# Patient Record
Sex: MALE | Race: ASIAN | NOT HISPANIC OR LATINO | URBAN - METROPOLITAN AREA
[De-identification: names, ages, dates, MRNs, and addresses within clinical notes are randomized per-mention and may not be internally consistent; named-entity substitution may affect disease eponyms.]

---

## 2017-08-18 ENCOUNTER — INPATIENT (INPATIENT)
Facility: HOSPITAL | Age: 37
LOS: 1 days | Discharge: HOME | DRG: 340 | End: 2017-08-20
Attending: SURGERY | Admitting: SURGERY
Payer: COMMERCIAL

## 2017-08-18 VITALS
SYSTOLIC BLOOD PRESSURE: 147 MMHG | DIASTOLIC BLOOD PRESSURE: 104 MMHG | HEART RATE: 104 BPM | OXYGEN SATURATION: 96 % | RESPIRATION RATE: 18 BRPM | TEMPERATURE: 99 F

## 2017-08-18 PROCEDURE — 99285 EMERGENCY DEPT VISIT HI MDM: CPT | Mod: 25

## 2017-08-18 NOTE — ED ADULT NURSE NOTE - OBJECTIVE STATEMENT
rlq abd pain since am; no n/v or diarhea; pt guarding abdomen; facial grimacing; did not take any thing for pain at home; never had this pain before; last bm today

## 2017-08-19 ENCOUNTER — RESULT REVIEW (OUTPATIENT)
Age: 37
End: 2017-08-19

## 2017-08-19 ENCOUNTER — TRANSCRIPTION ENCOUNTER (OUTPATIENT)
Age: 37
End: 2017-08-19

## 2017-08-19 DIAGNOSIS — K37 UNSPECIFIED APPENDICITIS: ICD-10-CM

## 2017-08-19 LAB
ALBUMIN SERPL ELPH-MCNC: 4.7 G/DL — SIGNIFICANT CHANGE UP (ref 3.3–5)
ALP SERPL-CCNC: 63 U/L — SIGNIFICANT CHANGE UP (ref 40–120)
ALT FLD-CCNC: 32 U/L RC — SIGNIFICANT CHANGE UP (ref 10–45)
ANION GAP SERPL CALC-SCNC: 14 MMOL/L — SIGNIFICANT CHANGE UP (ref 5–17)
ANION GAP SERPL CALC-SCNC: 14 MMOL/L — SIGNIFICANT CHANGE UP (ref 5–17)
APPEARANCE UR: CLEAR — SIGNIFICANT CHANGE UP
APTT BLD: 36.6 SEC — SIGNIFICANT CHANGE UP (ref 27.5–37.4)
AST SERPL-CCNC: 34 U/L — SIGNIFICANT CHANGE UP (ref 10–40)
BILIRUB SERPL-MCNC: 0.3 MG/DL — SIGNIFICANT CHANGE UP (ref 0.2–1.2)
BILIRUB UR-MCNC: NEGATIVE — SIGNIFICANT CHANGE UP
BLD GP AB SCN SERPL QL: NEGATIVE — SIGNIFICANT CHANGE UP
BUN SERPL-MCNC: 10 MG/DL — SIGNIFICANT CHANGE UP (ref 7–23)
BUN SERPL-MCNC: 10 MG/DL — SIGNIFICANT CHANGE UP (ref 7–23)
CALCIUM SERPL-MCNC: 8.8 MG/DL — SIGNIFICANT CHANGE UP (ref 8.4–10.5)
CALCIUM SERPL-MCNC: 8.9 MG/DL — SIGNIFICANT CHANGE UP (ref 8.4–10.5)
CHLORIDE SERPL-SCNC: 101 MMOL/L — SIGNIFICANT CHANGE UP (ref 96–108)
CHLORIDE SERPL-SCNC: 102 MMOL/L — SIGNIFICANT CHANGE UP (ref 96–108)
CO2 SERPL-SCNC: 23 MMOL/L — SIGNIFICANT CHANGE UP (ref 22–31)
CO2 SERPL-SCNC: 25 MMOL/L — SIGNIFICANT CHANGE UP (ref 22–31)
COLOR SPEC: SIGNIFICANT CHANGE UP
CREAT SERPL-MCNC: 0.98 MG/DL — SIGNIFICANT CHANGE UP (ref 0.5–1.3)
CREAT SERPL-MCNC: 1.06 MG/DL — SIGNIFICANT CHANGE UP (ref 0.5–1.3)
DIFF PNL FLD: NEGATIVE — SIGNIFICANT CHANGE UP
GAS PNL BLDV: SIGNIFICANT CHANGE UP
GLUCOSE SERPL-MCNC: 138 MG/DL — HIGH (ref 70–99)
GLUCOSE SERPL-MCNC: 139 MG/DL — HIGH (ref 70–99)
GLUCOSE UR QL: NEGATIVE — SIGNIFICANT CHANGE UP
HCT VFR BLD CALC: 37.8 % — LOW (ref 39–50)
HCT VFR BLD CALC: 41.9 % — SIGNIFICANT CHANGE UP (ref 39–50)
HGB BLD-MCNC: 12.4 G/DL — LOW (ref 13–17)
HGB BLD-MCNC: 14.3 G/DL — SIGNIFICANT CHANGE UP (ref 13–17)
INR BLD: 1.11 RATIO — SIGNIFICANT CHANGE UP (ref 0.88–1.16)
KETONES UR-MCNC: NEGATIVE — SIGNIFICANT CHANGE UP
LEUKOCYTE ESTERASE UR-ACNC: NEGATIVE — SIGNIFICANT CHANGE UP
MAGNESIUM SERPL-MCNC: 2 MG/DL — SIGNIFICANT CHANGE UP (ref 1.6–2.6)
MCHC RBC-ENTMCNC: 25 PG — LOW (ref 27–34)
MCHC RBC-ENTMCNC: 27.3 PG — SIGNIFICANT CHANGE UP (ref 27–34)
MCHC RBC-ENTMCNC: 32.8 GM/DL — SIGNIFICANT CHANGE UP (ref 32–36)
MCHC RBC-ENTMCNC: 34.2 GM/DL — SIGNIFICANT CHANGE UP (ref 32–36)
MCV RBC AUTO: 76.2 FL — LOW (ref 80–100)
MCV RBC AUTO: 79.9 FL — LOW (ref 80–100)
NITRITE UR-MCNC: NEGATIVE — SIGNIFICANT CHANGE UP
PH UR: 8 — SIGNIFICANT CHANGE UP (ref 5–8)
PHOSPHATE SERPL-MCNC: 3.5 MG/DL — SIGNIFICANT CHANGE UP (ref 2.5–4.5)
PLATELET # BLD AUTO: 261 K/UL — SIGNIFICANT CHANGE UP (ref 150–400)
PLATELET # BLD AUTO: 274 K/UL — SIGNIFICANT CHANGE UP (ref 150–400)
POTASSIUM SERPL-MCNC: 3.5 MMOL/L — SIGNIFICANT CHANGE UP (ref 3.5–5.3)
POTASSIUM SERPL-MCNC: 4.7 MMOL/L — SIGNIFICANT CHANGE UP (ref 3.5–5.3)
POTASSIUM SERPL-SCNC: 3.5 MMOL/L — SIGNIFICANT CHANGE UP (ref 3.5–5.3)
POTASSIUM SERPL-SCNC: 4.7 MMOL/L — SIGNIFICANT CHANGE UP (ref 3.5–5.3)
PROT SERPL-MCNC: 7.3 G/DL — SIGNIFICANT CHANGE UP (ref 6–8.3)
PROT UR-MCNC: NEGATIVE — SIGNIFICANT CHANGE UP
PROTHROM AB SERPL-ACNC: 12.1 SEC — SIGNIFICANT CHANGE UP (ref 9.8–12.7)
RBC # BLD: 4.96 M/UL — SIGNIFICANT CHANGE UP (ref 4.2–5.8)
RBC # BLD: 5.24 M/UL — SIGNIFICANT CHANGE UP (ref 4.2–5.8)
RBC # FLD: 12.5 % — SIGNIFICANT CHANGE UP (ref 10.3–14.5)
RBC # FLD: 13.6 % — SIGNIFICANT CHANGE UP (ref 10.3–14.5)
RH IG SCN BLD-IMP: POSITIVE — SIGNIFICANT CHANGE UP
RH IG SCN BLD-IMP: POSITIVE — SIGNIFICANT CHANGE UP
SODIUM SERPL-SCNC: 138 MMOL/L — SIGNIFICANT CHANGE UP (ref 135–145)
SODIUM SERPL-SCNC: 141 MMOL/L — SIGNIFICANT CHANGE UP (ref 135–145)
SP GR SPEC: 1.01 — SIGNIFICANT CHANGE UP (ref 1.01–1.02)
UROBILINOGEN FLD QL: NEGATIVE — SIGNIFICANT CHANGE UP
WBC # BLD: 10.8 K/UL — HIGH (ref 3.8–10.5)
WBC # BLD: 8.63 K/UL — SIGNIFICANT CHANGE UP (ref 3.8–10.5)
WBC # FLD AUTO: 10.8 K/UL — HIGH (ref 3.8–10.5)
WBC # FLD AUTO: 8.63 K/UL — SIGNIFICANT CHANGE UP (ref 3.8–10.5)

## 2017-08-19 PROCEDURE — 99222 1ST HOSP IP/OBS MODERATE 55: CPT | Mod: 57

## 2017-08-19 PROCEDURE — 74177 CT ABD & PELVIS W/CONTRAST: CPT | Mod: 26

## 2017-08-19 PROCEDURE — 76705 ECHO EXAM OF ABDOMEN: CPT | Mod: 26,RT

## 2017-08-19 PROCEDURE — 88304 TISSUE EXAM BY PATHOLOGIST: CPT | Mod: 26

## 2017-08-19 PROCEDURE — 44970 LAPAROSCOPY APPENDECTOMY: CPT

## 2017-08-19 RX ORDER — HYDROMORPHONE HYDROCHLORIDE 2 MG/ML
0.5 INJECTION INTRAMUSCULAR; INTRAVENOUS; SUBCUTANEOUS
Qty: 0 | Refills: 0 | Status: DISCONTINUED | OUTPATIENT
Start: 2017-08-19 | End: 2017-08-19

## 2017-08-19 RX ORDER — MORPHINE SULFATE 50 MG/1
4 CAPSULE, EXTENDED RELEASE ORAL ONCE
Qty: 0 | Refills: 0 | Status: DISCONTINUED | OUTPATIENT
Start: 2017-08-19 | End: 2017-08-19

## 2017-08-19 RX ORDER — PIPERACILLIN AND TAZOBACTAM 4; .5 G/20ML; G/20ML
3.38 INJECTION, POWDER, LYOPHILIZED, FOR SOLUTION INTRAVENOUS EVERY 8 HOURS
Qty: 0 | Refills: 0 | Status: DISCONTINUED | OUTPATIENT
Start: 2017-08-19 | End: 2017-08-19

## 2017-08-19 RX ORDER — OXYCODONE AND ACETAMINOPHEN 5; 325 MG/1; MG/1
1 TABLET ORAL EVERY 6 HOURS
Qty: 0 | Refills: 0 | Status: DISCONTINUED | OUTPATIENT
Start: 2017-08-19 | End: 2017-08-20

## 2017-08-19 RX ORDER — PIPERACILLIN AND TAZOBACTAM 4; .5 G/20ML; G/20ML
3.38 INJECTION, POWDER, LYOPHILIZED, FOR SOLUTION INTRAVENOUS ONCE
Qty: 0 | Refills: 0 | Status: COMPLETED | OUTPATIENT
Start: 2017-08-19 | End: 2017-08-19

## 2017-08-19 RX ORDER — SODIUM CHLORIDE 9 MG/ML
1000 INJECTION, SOLUTION INTRAVENOUS
Qty: 0 | Refills: 0 | Status: DISCONTINUED | OUTPATIENT
Start: 2017-08-19 | End: 2017-08-20

## 2017-08-19 RX ORDER — ONDANSETRON 8 MG/1
4 TABLET, FILM COATED ORAL ONCE
Qty: 0 | Refills: 0 | Status: DISCONTINUED | OUTPATIENT
Start: 2017-08-19 | End: 2017-08-19

## 2017-08-19 RX ORDER — OXYCODONE AND ACETAMINOPHEN 5; 325 MG/1; MG/1
2 TABLET ORAL EVERY 6 HOURS
Qty: 0 | Refills: 0 | Status: DISCONTINUED | OUTPATIENT
Start: 2017-08-19 | End: 2017-08-20

## 2017-08-19 RX ORDER — SODIUM CHLORIDE 9 MG/ML
1000 INJECTION INTRAMUSCULAR; INTRAVENOUS; SUBCUTANEOUS ONCE
Qty: 0 | Refills: 0 | Status: COMPLETED | OUTPATIENT
Start: 2017-08-19 | End: 2017-08-19

## 2017-08-19 RX ORDER — HYDROMORPHONE HYDROCHLORIDE 2 MG/ML
1 INJECTION INTRAMUSCULAR; INTRAVENOUS; SUBCUTANEOUS
Qty: 0 | Refills: 0 | Status: DISCONTINUED | OUTPATIENT
Start: 2017-08-19 | End: 2017-08-19

## 2017-08-19 RX ORDER — HEPARIN SODIUM 5000 [USP'U]/ML
5000 INJECTION INTRAVENOUS; SUBCUTANEOUS EVERY 8 HOURS
Qty: 0 | Refills: 0 | Status: DISCONTINUED | OUTPATIENT
Start: 2017-08-19 | End: 2017-08-19

## 2017-08-19 RX ORDER — MORPHINE SULFATE 50 MG/1
2 CAPSULE, EXTENDED RELEASE ORAL ONCE
Qty: 0 | Refills: 0 | Status: DISCONTINUED | OUTPATIENT
Start: 2017-08-19 | End: 2017-08-19

## 2017-08-19 RX ORDER — BENZOCAINE AND MENTHOL 5; 1 G/100ML; G/100ML
1 LIQUID ORAL
Qty: 0 | Refills: 0 | Status: DISCONTINUED | OUTPATIENT
Start: 2017-08-19 | End: 2017-08-20

## 2017-08-19 RX ORDER — POTASSIUM CHLORIDE 20 MEQ
20 PACKET (EA) ORAL
Qty: 0 | Refills: 0 | Status: DISCONTINUED | OUTPATIENT
Start: 2017-08-19 | End: 2017-08-19

## 2017-08-19 RX ORDER — OMEPRAZOLE 10 MG/1
0 CAPSULE, DELAYED RELEASE ORAL
Qty: 0 | Refills: 0 | COMMUNITY

## 2017-08-19 RX ORDER — POTASSIUM CHLORIDE 20 MEQ
10 PACKET (EA) ORAL
Qty: 0 | Refills: 0 | Status: DISCONTINUED | OUTPATIENT
Start: 2017-08-19 | End: 2017-08-20

## 2017-08-19 RX ORDER — SODIUM CHLORIDE 9 MG/ML
1000 INJECTION, SOLUTION INTRAVENOUS
Qty: 0 | Refills: 0 | Status: DISCONTINUED | OUTPATIENT
Start: 2017-08-19 | End: 2017-08-19

## 2017-08-19 RX ORDER — ENOXAPARIN SODIUM 100 MG/ML
40 INJECTION SUBCUTANEOUS EVERY 24 HOURS
Qty: 0 | Refills: 0 | Status: DISCONTINUED | OUTPATIENT
Start: 2017-08-19 | End: 2017-08-20

## 2017-08-19 RX ADMIN — Medication 100 MILLIEQUIVALENT(S): at 21:10

## 2017-08-19 RX ADMIN — MORPHINE SULFATE 2 MILLIGRAM(S): 50 CAPSULE, EXTENDED RELEASE ORAL at 22:22

## 2017-08-19 RX ADMIN — PIPERACILLIN AND TAZOBACTAM 25 GRAM(S): 4; .5 INJECTION, POWDER, LYOPHILIZED, FOR SOLUTION INTRAVENOUS at 13:02

## 2017-08-19 RX ADMIN — MORPHINE SULFATE 2 MILLIGRAM(S): 50 CAPSULE, EXTENDED RELEASE ORAL at 21:52

## 2017-08-19 RX ADMIN — HEPARIN SODIUM 5000 UNIT(S): 5000 INJECTION INTRAVENOUS; SUBCUTANEOUS at 06:03

## 2017-08-19 RX ADMIN — SODIUM CHLORIDE 100 MILLILITER(S): 9 INJECTION, SOLUTION INTRAVENOUS at 05:51

## 2017-08-19 RX ADMIN — SODIUM CHLORIDE 1000 MILLILITER(S): 9 INJECTION INTRAMUSCULAR; INTRAVENOUS; SUBCUTANEOUS at 01:54

## 2017-08-19 RX ADMIN — PIPERACILLIN AND TAZOBACTAM 200 GRAM(S): 4; .5 INJECTION, POWDER, LYOPHILIZED, FOR SOLUTION INTRAVENOUS at 05:08

## 2017-08-19 RX ADMIN — HEPARIN SODIUM 5000 UNIT(S): 5000 INJECTION INTRAVENOUS; SUBCUTANEOUS at 13:08

## 2017-08-19 RX ADMIN — Medication 100 MILLIEQUIVALENT(S): at 15:21

## 2017-08-19 RX ADMIN — MORPHINE SULFATE 4 MILLIGRAM(S): 50 CAPSULE, EXTENDED RELEASE ORAL at 01:55

## 2017-08-19 RX ADMIN — MORPHINE SULFATE 4 MILLIGRAM(S): 50 CAPSULE, EXTENDED RELEASE ORAL at 04:54

## 2017-08-19 NOTE — H&P ADULT - NSHPPHYSICALEXAM_GEN_ALL_CORE
General: No acute distress, resting comfortably  Respiratory: Nonlabored, clear to auscultation bilaterally  Cardiovascular: Regular rate and rhythm  Abdomen: Soft, nondistended, tender in RLQ, no rebound or guarding  Extremities: Warm

## 2017-08-19 NOTE — H&P ADULT - NSHPLABSRESULTS_GEN_ALL_CORE
CBC (08-19 @ 00:28)                              14.3                           10.8<H>  )----------------(  261        --    % Neutrophils, --    % Lymphocytes, ANC: --                                  41.9      BMP (08-19 @ 00:28)             141     |  102     |  10    		Ca++ --      Ca 8.9                ---------------------------------( 138<H>		Mg --                 4.7     |  25      |  1.06  			Ph --        LFTs (08-19 @ 00:28)      TPro 7.3 / Alb 4.7 / TBili 0.3 / DBili -- / AST 34 / ALT 32 / AlkPhos 63    Coags (08-19 @ 02:02)  aPTT 36.6 / INR 1.11 / PT 12.1        VBG (08-19 @ 02:02)     7.42 / 45 / 51<H> / 28 / 3.9<H> / 85%     Lactate: 1.2      IMAGING:  CT Abdomen and Pelvis w/ Oral Cont and w/ IV Cont (08.19.17 @ 04:11) >  IMPRESSION:  Acute appendicitis without evidence for perforation or abscess formation.  Mild hepatic steatosis.

## 2017-08-19 NOTE — H&P ADULT - NSHPREVIEWOFSYSTEMS_GEN_ALL_CORE
General: No fevers/chills, normal appetite  HEENT: No blurry or double vision, no runny nose  Respiratory: No cough, no shortness of breath  Cardiovascular: No palpitations, no chest pain  Gastrointestinal: + abdominal pain; No nausea, emesis, constipation, diarrhea, melena  Genitourinary: No dysuria, no urinary frequency  Integumentary: No rashes  Psych: Normal interactions  Neuro: No confusion  Musculoskeletal: no joint pain

## 2017-08-19 NOTE — H&P ADULT - ATTENDING COMMENTS
36 year old male with past medical history significant for GERD presents with one day of pain in the right lower quadrant. He denies chest pain or dyspnea.  I have reviewed his PMH/PSH/medications/labs and imaging.  On physical exam he is hemodynamically normal.  Cardiopulmonary exam is normal  He has tenderness to the right lower quadrant to palpation with localized peritonitis.  Laboratory data reviewed with WBC 10.8.    The patient has clinical and radiographic signs indicative of acute appendicitis. Patient is admitted and consented for a laparoscopic appendectomy. The risks and benefits have been explained.  NPO, IVF  Continue antibiotics  Type and screen

## 2017-08-19 NOTE — ED PROVIDER NOTE - OBJECTIVE STATEMENT
Malia Seals, DO: 36M with hx of GERD here for constant R flank pain  and RLQ pain x 24 hours that migrated from R flank to RLQ. No hx of abdominal surgeries. Last BM today. Denies nausea/vomiting, diarrhea, testicular pain, dark or bloody stools, hematuria, dysuria, frequency.   PMD: Kyung

## 2017-08-19 NOTE — H&P ADULT - HISTORY OF PRESENT ILLNESS
36M with PMHx GERD presenting with abdominal pain. Patient reports pain began yesterday morning in his right back and radiated to his right anterior abdomen and down to his groin. He denies fevers but reports chills. No nausea/emesis, diarrhea, or dysuria. has never had symptoms like this before.

## 2017-08-19 NOTE — PROGRESS NOTE ADULT - SUBJECTIVE AND OBJECTIVE BOX
[facility] GENERAL SURGERY POST-OP NOTE    SUBJECTIVE: Pt seen and evaluated at bedside. Resting comfortably in bed. Pain controlled. Denies nausea/vomiting, CP, palpitations, SOB, lightheaded, dizziness.    Objective:  Gen: NAD, AAOx3  Pulm: b/l chest rise. No work on breathing  Card: normal rate, RR, no m/r/g  Abd: soft, appropriately tender, ND. Dressings CDI.    Vital Signs Last 24 Hrs  T(C): 37.2 (19 Aug 2017 22:13), Max: 37.3 (19 Aug 2017 15:38)  T(F): 98.9 (19 Aug 2017 22:13), Max: 99.1 (19 Aug 2017 15:38)  HR: 94 (19 Aug 2017 22:13) (79 - 108)  BP: 119/79 (19 Aug 2017 22:13) (114/65 - 150/106)  BP(mean): 94 (19 Aug 2017 19:15) (88 - 105)  RR: 18 (19 Aug 2017 22:13) (15 - 19)  SpO2: 94% (19 Aug 2017 22:13) (94% - 99%)  I&O's Summary    18 Aug 2017 07:  -  19 Aug 2017 07:00  --------------------------------------------------------  IN: 1200 mL / OUT: 0 mL / NET: 1200 mL    19 Aug 2017 07:  -  19 Aug 2017 23:03  --------------------------------------------------------  IN: 900 mL / OUT: 2475 mL / NET: -1575 mL      I&O's Detail    18 Aug 2017 07:  -  19 Aug 2017 07:00  --------------------------------------------------------  IN:    IV PiggyBack: 100 mL    lactated ringers.: 100 mL    Sodium Chloride 0.9% IV Bolus: 1000 mL  Total IN: 1200 mL    OUT:  Total OUT: 0 mL    Total NET: 1200 mL      19 Aug 2017 07:01  -  19 Aug 2017 23:03  --------------------------------------------------------  IN:    IV PiggyBack: 100 mL    lactated ringers.: 800 mL  Total IN: 900 mL    OUT:    Voided: 2475 mL  Total OUT: 2475 mL    Total NET: -1575 mL          MEDICATIONS  (STANDING):  potassium chloride  10 mEq/100 mL IVPB 10 milliEquivalent(s) IV Intermittent every 1 hour  enoxaparin Injectable 40 milliGRAM(s) SubCutaneous every 24 hours  lactated ringers. 1000 milliLiter(s) (100 mL/Hr) IV Continuous <Continuous>    MEDICATIONS  (PRN):  oxyCODONE    5 mG/acetaminophen 325 mG 1 Tablet(s) Oral every 6 hours PRN Moderate Pain (4 - 6)  oxyCODONE    5 mG/acetaminophen 325 mG 2 Tablet(s) Oral every 6 hours PRN Severe Pain (7 - 10)  benzocaine 15 mG/menthol 3.6 mG Lozenge 1 Lozenge Oral every 3 hours PRN Sore Throat      LABS:                        12.4   8.63  )-----------( 274      ( 19 Aug 2017 07:17 )             37.8         138  |  101  |  10  ----------------------------<  139<H>  3.5   |  23  |  0.98    Ca    8.8      19 Aug 2017 07:17  Phos  3.5       Mg     2.0         TPro  7.3  /  Alb  4.7  /  TBili  0.3  /  DBili  x   /  AST  34  /  ALT  32  /  AlkPhos  63      PT/INR - ( 19 Aug 2017 02:02 )   PT: 12.1 sec;   INR: 1.11 ratio         PTT - ( 19 Aug 2017 02:02 )  PTT:36.6 sec  Urinalysis Basic - ( 19 Aug 2017 00:48 )    Color: x / Appearance: Clear / S.011 / pH: x  Gluc: x / Ketone: Negative  / Bili: Negative / Urobili: Negative   Blood: x / Protein: Negative / Nitrite: Negative   Leuk Esterase: Negative / RBC: x / WBC x   Sq Epi: x / Non Sq Epi: x / Bacteria: x        RADIOLOGY & ADDITIONAL STUDIES:      A/P: 36y Male  s/p   - Pain control  - Strict I/O's  - F/U GI fxn  - advance diet as tolerates  - OOB/ambulate/DVT ppx  - AM labs [facility] GENERAL SURGERY POST-OP NOTE    SUBJECTIVE: Pt seen and evaluated at bedside. Resting comfortably in bed. Pain controlled. Denies nausea/vomiting, CP, palpitations, SOB, lightheaded, dizziness.    Objective:  Gen: NAD, AAOx3  Pulm: b/l chest rise. No work on breathing  Card: normal rate, RR, no m/r/g  Abd: soft, appropriately tender, ND. Dressings CDI.    Vital Signs Last 24 Hrs  T(C): 37.2 (19 Aug 2017 22:13), Max: 37.3 (19 Aug 2017 15:38)  T(F): 98.9 (19 Aug 2017 22:13), Max: 99.1 (19 Aug 2017 15:38)  HR: 94 (19 Aug 2017 22:13) (79 - 108)  BP: 119/79 (19 Aug 2017 22:13) (114/65 - 150/106)  BP(mean): 94 (19 Aug 2017 19:15) (88 - 105)  RR: 18 (19 Aug 2017 22:13) (15 - 19)  SpO2: 94% (19 Aug 2017 22:13) (94% - 99%)  I&O's Summary    18 Aug 2017 07:  -  19 Aug 2017 07:00  --------------------------------------------------------  IN: 1200 mL / OUT: 0 mL / NET: 1200 mL    19 Aug 2017 07:  -  19 Aug 2017 23:03  --------------------------------------------------------  IN: 900 mL / OUT: 2475 mL / NET: -1575 mL      I&O's Detail    18 Aug 2017 07:  -  19 Aug 2017 07:00  --------------------------------------------------------  IN:    IV PiggyBack: 100 mL    lactated ringers.: 100 mL    Sodium Chloride 0.9% IV Bolus: 1000 mL  Total IN: 1200 mL    OUT:  Total OUT: 0 mL    Total NET: 1200 mL      19 Aug 2017 07:01  -  19 Aug 2017 23:03  --------------------------------------------------------  IN:    IV PiggyBack: 100 mL    lactated ringers.: 800 mL  Total IN: 900 mL    OUT:    Voided: 2475 mL  Total OUT: 2475 mL    Total NET: -1575 mL          MEDICATIONS  (STANDING):  potassium chloride  10 mEq/100 mL IVPB 10 milliEquivalent(s) IV Intermittent every 1 hour  enoxaparin Injectable 40 milliGRAM(s) SubCutaneous every 24 hours  lactated ringers. 1000 milliLiter(s) (100 mL/Hr) IV Continuous <Continuous>    MEDICATIONS  (PRN):  oxyCODONE    5 mG/acetaminophen 325 mG 1 Tablet(s) Oral every 6 hours PRN Moderate Pain (4 - 6)  oxyCODONE    5 mG/acetaminophen 325 mG 2 Tablet(s) Oral every 6 hours PRN Severe Pain (7 - 10)  benzocaine 15 mG/menthol 3.6 mG Lozenge 1 Lozenge Oral every 3 hours PRN Sore Throat      LABS:                        12.4   8.63  )-----------( 274      ( 19 Aug 2017 07:17 )             37.8         138  |  101  |  10  ----------------------------<  139<H>  3.5   |  23  |  0.98    Ca    8.8      19 Aug 2017 07:17  Phos  3.5       Mg     2.0         TPro  7.3  /  Alb  4.7  /  TBili  0.3  /  DBili  x   /  AST  34  /  ALT  32  /  AlkPhos  63      PT/INR - ( 19 Aug 2017 02:02 )   PT: 12.1 sec;   INR: 1.11 ratio         PTT - ( 19 Aug 2017 02:02 )  PTT:36.6 sec  Urinalysis Basic - ( 19 Aug 2017 00:48 )    Color: x / Appearance: Clear / S.011 / pH: x  Gluc: x / Ketone: Negative  / Bili: Negative / Urobili: Negative   Blood: x / Protein: Negative / Nitrite: Negative   Leuk Esterase: Negative / RBC: x / WBC x   Sq Epi: x / Non Sq Epi: x / Bacteria: x        RADIOLOGY & ADDITIONAL STUDIES:      A/P: 36y Male  s/p lap appy without complications  - Pain control  - Strict I/O's  - F/U GI fxn  - advance diet as tolerates  - OOB/ambulate/DVT ppx  - AM labs

## 2017-08-19 NOTE — ED PROVIDER NOTE - MEDICAL DECISION MAKING DETAILS
BELGICA Cordova MD: 35 y/o male with PMH GERD p/w R sided flank and RLQ pain. Pain began in R flank 24 hrs ago, then migrated to RLQ. Last BM today. Denies nausea/vomiting, diarrhea, testicular pain, dark or bloody stools, hematuria, dysuria, frequency, f/c.  +RLQ ttp on exam  DDx: appy, kidney stone, pyelo  Plan: labs, u/a, ucx, CT A/P

## 2017-08-19 NOTE — ED PROVIDER NOTE - NS ED ROS FT
Malia Seals, DO: ROS: denies HA, weakness, dizziness, fevers/chills, nausea/vomiting, chest pain, SOB, diaphoresis, diarrhea, joint pain, neuro deficits, dysuria/hematuria, rash

## 2017-08-19 NOTE — H&P ADULT - ASSESSMENT
36M with acute appendicitis  -Admit to [facility] under Dr. Gonzalez  -NPO/IVF  -Antibiotics  -Preop/consent for OR today  -VTE prophylaxis

## 2017-08-20 ENCOUNTER — TRANSCRIPTION ENCOUNTER (OUTPATIENT)
Age: 37
End: 2017-08-20

## 2017-08-20 VITALS
SYSTOLIC BLOOD PRESSURE: 146 MMHG | TEMPERATURE: 98 F | DIASTOLIC BLOOD PRESSURE: 86 MMHG | OXYGEN SATURATION: 95 % | HEART RATE: 102 BPM | RESPIRATION RATE: 18 BRPM

## 2017-08-20 LAB
ANION GAP SERPL CALC-SCNC: 18 MMOL/L — HIGH (ref 5–17)
BASOPHILS # BLD AUTO: 0.01 K/UL — SIGNIFICANT CHANGE UP (ref 0–0.2)
BASOPHILS NFR BLD AUTO: 0.1 % — SIGNIFICANT CHANGE UP (ref 0–2)
BUN SERPL-MCNC: 10 MG/DL — SIGNIFICANT CHANGE UP (ref 7–23)
CALCIUM SERPL-MCNC: 9.3 MG/DL — SIGNIFICANT CHANGE UP (ref 8.4–10.5)
CHLORIDE SERPL-SCNC: 100 MMOL/L — SIGNIFICANT CHANGE UP (ref 96–108)
CO2 SERPL-SCNC: 22 MMOL/L — SIGNIFICANT CHANGE UP (ref 22–31)
CREAT SERPL-MCNC: 1.03 MG/DL — SIGNIFICANT CHANGE UP (ref 0.5–1.3)
CULTURE RESULTS: NO GROWTH — SIGNIFICANT CHANGE UP
EOSINOPHIL # BLD AUTO: 0.01 K/UL — SIGNIFICANT CHANGE UP (ref 0–0.5)
EOSINOPHIL NFR BLD AUTO: 0.1 % — SIGNIFICANT CHANGE UP (ref 0–6)
GLUCOSE SERPL-MCNC: 128 MG/DL — HIGH (ref 70–99)
HCT VFR BLD CALC: 39 % — SIGNIFICANT CHANGE UP (ref 39–50)
HGB BLD-MCNC: 12.9 G/DL — LOW (ref 13–17)
IMM GRANULOCYTES NFR BLD AUTO: 0.1 % — SIGNIFICANT CHANGE UP (ref 0–1.5)
LYMPHOCYTES # BLD AUTO: 1.62 K/UL — SIGNIFICANT CHANGE UP (ref 1–3.3)
LYMPHOCYTES # BLD AUTO: 17.2 % — SIGNIFICANT CHANGE UP (ref 13–44)
MCHC RBC-ENTMCNC: 24.8 PG — LOW (ref 27–34)
MCHC RBC-ENTMCNC: 33.1 GM/DL — SIGNIFICANT CHANGE UP (ref 32–36)
MCV RBC AUTO: 75 FL — LOW (ref 80–100)
MONOCYTES # BLD AUTO: 0.94 K/UL — HIGH (ref 0–0.9)
MONOCYTES NFR BLD AUTO: 10 % — SIGNIFICANT CHANGE UP (ref 2–14)
NEUTROPHILS # BLD AUTO: 6.84 K/UL — SIGNIFICANT CHANGE UP (ref 1.8–7.4)
NEUTROPHILS NFR BLD AUTO: 72.5 % — SIGNIFICANT CHANGE UP (ref 43–77)
PLATELET # BLD AUTO: 321 K/UL — SIGNIFICANT CHANGE UP (ref 150–400)
POTASSIUM SERPL-MCNC: 4 MMOL/L — SIGNIFICANT CHANGE UP (ref 3.5–5.3)
POTASSIUM SERPL-SCNC: 4 MMOL/L — SIGNIFICANT CHANGE UP (ref 3.5–5.3)
RBC # BLD: 5.2 M/UL — SIGNIFICANT CHANGE UP (ref 4.2–5.8)
RBC # FLD: 13.6 % — SIGNIFICANT CHANGE UP (ref 10.3–14.5)
SODIUM SERPL-SCNC: 140 MMOL/L — SIGNIFICANT CHANGE UP (ref 135–145)
SPECIMEN SOURCE: SIGNIFICANT CHANGE UP
WBC # BLD: 9.43 K/UL — SIGNIFICANT CHANGE UP (ref 3.8–10.5)
WBC # FLD AUTO: 9.43 K/UL — SIGNIFICANT CHANGE UP (ref 3.8–10.5)

## 2017-08-20 PROCEDURE — 82330 ASSAY OF CALCIUM: CPT

## 2017-08-20 PROCEDURE — 76705 ECHO EXAM OF ABDOMEN: CPT

## 2017-08-20 PROCEDURE — 96375 TX/PRO/DX INJ NEW DRUG ADDON: CPT

## 2017-08-20 PROCEDURE — 96374 THER/PROPH/DIAG INJ IV PUSH: CPT | Mod: XU

## 2017-08-20 PROCEDURE — 86901 BLOOD TYPING SEROLOGIC RH(D): CPT

## 2017-08-20 PROCEDURE — 86900 BLOOD TYPING SEROLOGIC ABO: CPT

## 2017-08-20 PROCEDURE — 85610 PROTHROMBIN TIME: CPT

## 2017-08-20 PROCEDURE — 85014 HEMATOCRIT: CPT

## 2017-08-20 PROCEDURE — 84100 ASSAY OF PHOSPHORUS: CPT

## 2017-08-20 PROCEDURE — 85027 COMPLETE CBC AUTOMATED: CPT

## 2017-08-20 PROCEDURE — 83690 ASSAY OF LIPASE: CPT

## 2017-08-20 PROCEDURE — 86850 RBC ANTIBODY SCREEN: CPT

## 2017-08-20 PROCEDURE — 74177 CT ABD & PELVIS W/CONTRAST: CPT

## 2017-08-20 PROCEDURE — 80053 COMPREHEN METABOLIC PANEL: CPT

## 2017-08-20 PROCEDURE — 85730 THROMBOPLASTIN TIME PARTIAL: CPT

## 2017-08-20 PROCEDURE — 99285 EMERGENCY DEPT VISIT HI MDM: CPT | Mod: 25

## 2017-08-20 PROCEDURE — 82947 ASSAY GLUCOSE BLOOD QUANT: CPT

## 2017-08-20 PROCEDURE — 83735 ASSAY OF MAGNESIUM: CPT

## 2017-08-20 PROCEDURE — 82435 ASSAY OF BLOOD CHLORIDE: CPT

## 2017-08-20 PROCEDURE — 87086 URINE CULTURE/COLONY COUNT: CPT

## 2017-08-20 PROCEDURE — 88304 TISSUE EXAM BY PATHOLOGIST: CPT

## 2017-08-20 PROCEDURE — 84132 ASSAY OF SERUM POTASSIUM: CPT

## 2017-08-20 PROCEDURE — 84295 ASSAY OF SERUM SODIUM: CPT

## 2017-08-20 PROCEDURE — 83605 ASSAY OF LACTIC ACID: CPT

## 2017-08-20 PROCEDURE — 81003 URINALYSIS AUTO W/O SCOPE: CPT

## 2017-08-20 PROCEDURE — 82803 BLOOD GASES ANY COMBINATION: CPT

## 2017-08-20 PROCEDURE — 80048 BASIC METABOLIC PNL TOTAL CA: CPT

## 2017-08-20 RX ORDER — OXYCODONE HYDROCHLORIDE 5 MG/1
1 TABLET ORAL
Qty: 5 | Refills: 0 | OUTPATIENT
Start: 2017-08-20

## 2017-08-20 RX ADMIN — OXYCODONE AND ACETAMINOPHEN 1 TABLET(S): 5; 325 TABLET ORAL at 15:30

## 2017-08-20 RX ADMIN — OXYCODONE AND ACETAMINOPHEN 1 TABLET(S): 5; 325 TABLET ORAL at 04:13

## 2017-08-20 RX ADMIN — ENOXAPARIN SODIUM 40 MILLIGRAM(S): 100 INJECTION SUBCUTANEOUS at 06:44

## 2017-08-20 RX ADMIN — OXYCODONE AND ACETAMINOPHEN 2 TABLET(S): 5; 325 TABLET ORAL at 11:23

## 2017-08-20 RX ADMIN — OXYCODONE AND ACETAMINOPHEN 1 TABLET(S): 5; 325 TABLET ORAL at 03:42

## 2017-08-20 RX ADMIN — OXYCODONE AND ACETAMINOPHEN 2 TABLET(S): 5; 325 TABLET ORAL at 11:58

## 2017-08-20 RX ADMIN — OXYCODONE AND ACETAMINOPHEN 1 TABLET(S): 5; 325 TABLET ORAL at 15:01

## 2017-08-20 NOTE — DISCHARGE NOTE ADULT - ADDITIONAL INSTRUCTIONS
1) Please make an appointment to follow up with Dr. Gonzalez in 7-10 days after your discharge. You may call their office to make an appointment at: [phone].    2) You should not lift any heavy items (greater than 15 lbs) for at least 2 weeks following your operation. No strenuous exercise for 2 weeks either.

## 2017-08-20 NOTE — DISCHARGE NOTE ADULT - CARE PLAN
Goal:	return to daily living activity prior to surgery, postoperative recovery  Instructions for follow-up, activity and diet:	You may resume your regular diet upon discharge.  Goal:	Pain management  Instructions for follow-up, activity and diet:	A prescription for oxycodon has been sent to your pharmacy. You should only take these for severe pain. For mild or moderate pain, you may take 650mg of tylenol every 6 hours. Principal Discharge DX:	Acute appendicitis, unspecified acute appendicitis type  Goal:	return to daily living activity prior to surgery, postoperative recovery  Instructions for follow-up, activity and diet:	You may resume your regular diet upon discharge.  Goal:	Pain management  Instructions for follow-up, activity and diet:	A prescription for oxycodon has been sent to your pharmacy. You should only take these for severe pain. For mild or moderate pain, you may take 650mg of tylenol every 6 hours.

## 2017-08-20 NOTE — DISCHARGE NOTE ADULT - PLAN OF CARE
return to daily living activity prior to surgery, postoperative recovery You may resume your regular diet upon discharge. Pain management A prescription for oxycodon has been sent to your pharmacy. You should only take these for severe pain. For mild or moderate pain, you may take 650mg of tylenol every 6 hours.

## 2017-08-20 NOTE — PROGRESS NOTE ADULT - ASSESSMENT
A/P: 36y Male  s/p lap appy without complications  - Pain control  - Strict I/O's  - F/U GI fxn  - advance diet as tolerates  - OOB/ambulate/DVT ppx  - AM labs  - DC today if tolerating diet

## 2017-08-20 NOTE — DISCHARGE NOTE ADULT - PATIENT PORTAL LINK FT
“You can access the [employer] Patient Portal, offered by [facility], by registering with the following website: [email or web address]”

## 2017-08-20 NOTE — DISCHARGE NOTE ADULT - MEDICATION SUMMARY - MEDICATIONS TO TAKE
I will START or STAY ON the medications listed below when I get home from the hospital:    oxyCODONE 5 mg oral tablet  -- 1 tab(s) by mouth every 4 to 6 hours, As Needed -for moderate pain -for severe pain MDD:4 tabs  -- Caution federal law prohibits the transfer of this drug to any person other  than the person for whom it was prescribed.  It is very important that you take or use this exactly as directed.  Do not skip doses or discontinue unless directed by your doctor.  May cause drowsiness.  Alcohol may intensify this effect.  Use care when operating dangerous machinery.  This prescription cannot be refilled.  Using more of this medication than prescribed may cause serious breathing problems.    -- Indication: For Pain med    omeprazole  --  by mouth   -- Indication: For GERD (gastroesophageal reflux disease)

## 2017-08-20 NOTE — DISCHARGE NOTE ADULT - HOSPITAL COURSE
HPI:  36M with PMHx GERD presenting with abdominal pain. Patient reports pain began yesterday morning in his right back and radiated to his right anterior abdomen and down to his groin. He denies fevers but reports chills. No nausea/emesis, diarrhea, or dysuria. has never had symptoms like this before.     Hospital Course:  The patient was admitted to the acute care surgery team on 8/19/17 after workup in the emergency department revealed acute appendicitis. That same day the patient was brought to the operating room where a laparoscopic appendectomy was performed, uncomplicated. The patient was transferred to the PACU in stable condition. He recovered from anaesthesia quickly and was transferred to the floor to be observed overnight. On hospital day 2 the patient's diet was advanced to a regular diet. He tolerated the diet well and his symptoms resolved. He was discharged later on hospital day 2.

## 2017-08-20 NOTE — DISCHARGE NOTE ADULT - CARE PROVIDER_API CALL
Zina Gonzalez), Surgery; Surgical Critical Care  [address] [facility]  [address] [place] [address]  Phone: [phone]  Fax: [phone]

## 2017-08-20 NOTE — PROGRESS NOTE ADULT - SUBJECTIVE AND OBJECTIVE BOX
ACS Progress Note    S: Patient seen and examined. No acute events overnight. Pain well controlled with current regimen.   Denies nausea/vomiting.   Endorses passing gas and bowel movements.     O:  Vital Signs Last 24 Hrs  T(C): 37.4 (20 Aug 2017 00:43), Max: 37.4 (20 Aug 2017 00:43)  T(F): 99.4 (20 Aug 2017 00:43), Max: 99.4 (20 Aug 2017 00:43)  HR: 92 (20 Aug 2017 00:43) (79 - 108)  BP: 108/70 (20 Aug 2017 00:43) (108/70 - 150/106)  BP(mean): 94 (19 Aug 2017 19:15) (88 - 105)  RR: 18 (20 Aug 2017 00:43) (15 - 18)  SpO2: 95% (20 Aug 2017 00:43) (94% - 99%)    I&O's Detail    18 Aug 2017 07:01  -  19 Aug 2017 07:00  --------------------------------------------------------  IN:    IV PiggyBack: 100 mL    lactated ringers.: 100 mL    Sodium Chloride 0.9% IV Bolus: 1000 mL  Total IN: 1200 mL    OUT:  Total OUT: 0 mL    Total NET: 1200 mL      19 Aug 2017 07:01  -  20 Aug 2017 01:40  --------------------------------------------------------  IN:    IV PiggyBack: 100 mL    lactated ringers.: 800 mL  Total IN: 900 mL    OUT:    Voided: 2475 mL  Total OUT: 2475 mL    Total NET: -1575 mL          MEDICATIONS  (STANDING):  potassium chloride  10 mEq/100 mL IVPB 10 milliEquivalent(s) IV Intermittent every 1 hour  enoxaparin Injectable 40 milliGRAM(s) SubCutaneous every 24 hours  lactated ringers. 1000 milliLiter(s) (100 mL/Hr) IV Continuous <Continuous>    MEDICATIONS  (PRN):  oxyCODONE    5 mG/acetaminophen 325 mG 1 Tablet(s) Oral every 6 hours PRN Moderate Pain (4 - 6)  oxyCODONE    5 mG/acetaminophen 325 mG 2 Tablet(s) Oral every 6 hours PRN Severe Pain (7 - 10)  benzocaine 15 mG/menthol 3.6 mG Lozenge 1 Lozenge Oral every 3 hours PRN Sore Throat                            12.4   8.63  )-----------( 274      ( 19 Aug 2017 07:17 )             37.8       08-19    138  |  101  |  10  ----------------------------<  139<H>  3.5   |  23  |  0.98    Ca    8.8      19 Aug 2017 07:17  Phos  3.5     08-19  Mg     2.0     08-19    TPro  7.3  /  Alb  4.7  /  TBili  0.3  /  DBili  x   /  AST  34  /  ALT  32  /  AlkPhos  63  08-19      Physical Exam:  Gen: Laying in bed, NAD, alert and oriented.   Resp: Unlabored breathing  Abd: soft, NTND    Lines:   IV: patent, in place. ACS Progress Note    S: Patient seen and examined. No acute events overnight. Pain well controlled with current regimen.   Denies nausea/vomiting.   Endorses passing gas and bowel movements.     O:  Vital Signs Last 24 Hrs  T(C): 37.4 (20 Aug 2017 00:43), Max: 37.4 (20 Aug 2017 00:43)  T(F): 99.4 (20 Aug 2017 00:43), Max: 99.4 (20 Aug 2017 00:43)  HR: 92 (20 Aug 2017 00:43) (79 - 108)  BP: 108/70 (20 Aug 2017 00:43) (108/70 - 150/106)  BP(mean): 94 (19 Aug 2017 19:15) (88 - 105)  RR: 18 (20 Aug 2017 00:43) (15 - 18)  SpO2: 95% (20 Aug 2017 00:43) (94% - 99%)    I&O's Detail    18 Aug 2017 07:01  -  19 Aug 2017 07:00  --------------------------------------------------------  IN:    IV PiggyBack: 100 mL    lactated ringers.: 100 mL    Sodium Chloride 0.9% IV Bolus: 1000 mL  Total IN: 1200 mL    OUT:  Total OUT: 0 mL    Total NET: 1200 mL      19 Aug 2017 07:01  -  20 Aug 2017 01:40  --------------------------------------------------------  IN:    IV PiggyBack: 100 mL    lactated ringers.: 800 mL  Total IN: 900 mL    OUT:    Voided: 2475 mL  Total OUT: 2475 mL    Total NET: -1575 mL          MEDICATIONS  (STANDING):  potassium chloride  10 mEq/100 mL IVPB 10 milliEquivalent(s) IV Intermittent every 1 hour  enoxaparin Injectable 40 milliGRAM(s) SubCutaneous every 24 hours  lactated ringers. 1000 milliLiter(s) (100 mL/Hr) IV Continuous <Continuous>    MEDICATIONS  (PRN):  oxyCODONE    5 mG/acetaminophen 325 mG 1 Tablet(s) Oral every 6 hours PRN Moderate Pain (4 - 6)  oxyCODONE    5 mG/acetaminophen 325 mG 2 Tablet(s) Oral every 6 hours PRN Severe Pain (7 - 10)  benzocaine 15 mG/menthol 3.6 mG Lozenge 1 Lozenge Oral every 3 hours PRN Sore Throat                            12.4   8.63  )-----------( 274      ( 19 Aug 2017 07:17 )             37.8       08-19    138  |  101  |  10  ----------------------------<  139<H>  3.5   |  23  |  0.98    Ca    8.8      19 Aug 2017 07:17  Phos  3.5     08-19  Mg     2.0     08-19    TPro  7.3  /  Alb  4.7  /  TBili  0.3  /  DBili  x   /  AST  34  /  ALT  32  /  AlkPhos  63  08-19      Physical Exam:  Gen: NAD, AAOx3  Pulm: b/l chest rise. No work on breathing  Card: normal rate, RR, no m/r/g  Abd: soft, appropriately tender, ND. Dressings CDI.    Lines:   IV: patent, in place.

## 2017-08-23 PROBLEM — K21.9 GASTRO-ESOPHAGEAL REFLUX DISEASE WITHOUT ESOPHAGITIS: Chronic | Status: ACTIVE | Noted: 2017-08-18

## 2017-09-01 ENCOUNTER — APPOINTMENT (OUTPATIENT)
Dept: TRAUMA SURGERY | Facility: CLINIC | Age: 37
End: 2017-09-01
Payer: COMMERCIAL

## 2017-09-01 VITALS
DIASTOLIC BLOOD PRESSURE: 85 MMHG | TEMPERATURE: 97.7 F | SYSTOLIC BLOOD PRESSURE: 124 MMHG | HEIGHT: 67 IN | BODY MASS INDEX: 29.98 KG/M2 | HEART RATE: 106 BPM | WEIGHT: 191 LBS

## 2017-09-01 DIAGNOSIS — M54.5 LOW BACK PAIN: ICD-10-CM

## 2017-09-01 DIAGNOSIS — M21.42 FLAT FOOT [PES PLANUS] (ACQUIRED), RIGHT FOOT: ICD-10-CM

## 2017-09-01 DIAGNOSIS — K35.3 ACUTE APPENDICITIS WITH LOCALIZED PERITONITIS: ICD-10-CM

## 2017-09-01 DIAGNOSIS — M21.41 FLAT FOOT [PES PLANUS] (ACQUIRED), RIGHT FOOT: ICD-10-CM

## 2017-09-01 PROCEDURE — 99024 POSTOP FOLLOW-UP VISIT: CPT

## 2017-09-02 PROBLEM — K35.3 ACUTE APPENDICITIS WITH LOCALIZED PERITONITIS: Status: RESOLVED | Noted: 2017-09-02 | Resolved: 2017-09-02

## 2017-09-02 RX ORDER — OXYCODONE 5 MG/1
5 TABLET ORAL
Qty: 5 | Refills: 0 | Status: COMPLETED | COMMUNITY
Start: 2017-08-20

## 2018-05-14 ENCOUNTER — APPOINTMENT (OUTPATIENT)
Dept: INTERNAL MEDICINE | Facility: CLINIC | Age: 38
End: 2018-05-14
Payer: COMMERCIAL

## 2018-05-14 VITALS
WEIGHT: 190 LBS | TEMPERATURE: 98.2 F | HEART RATE: 107 BPM | OXYGEN SATURATION: 98 % | RESPIRATION RATE: 18 BRPM | BODY MASS INDEX: 28.79 KG/M2 | SYSTOLIC BLOOD PRESSURE: 120 MMHG | DIASTOLIC BLOOD PRESSURE: 80 MMHG | HEIGHT: 68 IN

## 2018-05-14 PROCEDURE — 99214 OFFICE O/P EST MOD 30 MIN: CPT

## 2018-05-29 ENCOUNTER — APPOINTMENT (OUTPATIENT)
Dept: INTERNAL MEDICINE | Facility: CLINIC | Age: 38
End: 2018-05-29
Payer: COMMERCIAL

## 2018-05-29 VITALS
SYSTOLIC BLOOD PRESSURE: 120 MMHG | DIASTOLIC BLOOD PRESSURE: 78 MMHG | BODY MASS INDEX: 28.64 KG/M2 | HEIGHT: 68 IN | HEART RATE: 74 BPM | OXYGEN SATURATION: 99 % | WEIGHT: 189 LBS | TEMPERATURE: 98.3 F | RESPIRATION RATE: 16 BRPM

## 2018-05-29 PROCEDURE — 99395 PREV VISIT EST AGE 18-39: CPT

## 2018-05-29 RX ORDER — PROMETHAZINE HYDROCHLORIDE AND DEXTROMETHORPHAN HYDROBROMIDE ORAL SOLUTION 15; 6.25 MG/5ML; MG/5ML
6.25-15 SOLUTION ORAL
Qty: 210 | Refills: 2 | Status: DISCONTINUED | COMMUNITY
Start: 2018-05-14 | End: 2018-05-29

## 2018-05-29 RX ORDER — TOBRAMYCIN AND DEXAMETHASONE 3; 1 MG/ML; MG/ML
0.3-0.1 SUSPENSION/ DROPS OPHTHALMIC 4 TIMES DAILY
Qty: 5 | Refills: 2 | Status: DISCONTINUED | COMMUNITY
Start: 2018-05-14 | End: 2018-05-29

## 2018-05-29 RX ORDER — AMOXICILLIN AND CLAVULANATE POTASSIUM 500; 125 MG/1; MG/1
500-125 TABLET, FILM COATED ORAL TWICE DAILY
Qty: 14 | Refills: 0 | Status: DISCONTINUED | COMMUNITY
Start: 2018-05-14 | End: 2018-05-29

## 2018-05-29 NOTE — ASSESSMENT
[FreeTextEntry1] : 37 year old male found to have stable GERD, Hyperlipidemia, Vitamin D Deficiency,with the current regimen, diet and life style modifications, as counseled. Prior results reviewed and discussed with the patient during today's examination. Plan as ordered.\par Current symptoms are consistent with recurrent bacterial conjunctivitis of right eye , prescription management and further followup as ordered.\par

## 2018-05-29 NOTE — HEALTH RISK ASSESSMENT
[Good] : ~his/her~  mood as  good [No falls in past year] : Patient reported no falls in the past year [0] : 2) Feeling down, depressed, or hopeless: Not at all (0) [None] : None [] :  [Feels Safe at Home] : Feels safe at home [Fully functional (bathing, dressing, toileting, transferring, walking, feeding)] : Fully functional (bathing, dressing, toileting, transferring, walking, feeding) [Fully functional (using the telephone, shopping, preparing meals, housekeeping, doing laundry, using] : Fully functional and needs no help or supervision to perform IADLs (using the telephone, shopping, preparing meals, housekeeping, doing laundry, using transportation, managing medications and managing finances) [Smoke Detector] : smoke detector [Carbon Monoxide Detector] : carbon monoxide detector [Seat Belt] :  uses seat belt [Sunscreen] : uses sunscreen [Discussed at today's visit] : Advance Directives Discussed at today's visit [FreeTextEntry1] : Check up\par  [] : No [de-identified] : None [[id number]] : 0 [Change in mental status noted] : No change in mental status noted [Reports changes in hearing] : Reports no changes in hearing [Reports changes in vision] : Reports no changes in vision [Reports changes in dental health] : Reports no changes in dental health [de-identified] : As ordered for today.

## 2018-05-29 NOTE — HISTORY OF PRESENT ILLNESS
[de-identified] : 37 year old male patient with history of stable GERD, Hyperlipidemia, Vitamin D Deficiency, history as stated, presented for an annual preventative examination. Patient denies any associated symptoms of shortness of breath, chest pain, abdominal pain at this time.\par C/O recurrent redness of right eye without discharge, no fever, responding to Rx as ordered.

## 2018-06-01 LAB
25(OH)D3 SERPL-MCNC: 20.8 NG/ML
ALBUMIN SERPL ELPH-MCNC: 4.5 G/DL
ALP BLD-CCNC: 71 U/L
ALT SERPL-CCNC: 26 U/L
ANION GAP SERPL CALC-SCNC: 15 MMOL/L
APPEARANCE: CLEAR
AST SERPL-CCNC: 24 U/L
BASOPHILS # BLD AUTO: 0.05 K/UL
BASOPHILS NFR BLD AUTO: 0.9 %
BILIRUB SERPL-MCNC: 0.4 MG/DL
BILIRUBIN URINE: NEGATIVE
BLOOD URINE: NEGATIVE
BUN SERPL-MCNC: 16 MG/DL
CALCIUM SERPL-MCNC: 9.6 MG/DL
CHLORIDE SERPL-SCNC: 102 MMOL/L
CHOLEST SERPL-MCNC: 162 MG/DL
CHOLEST/HDLC SERPL: 4.9 RATIO
CO2 SERPL-SCNC: 24 MMOL/L
COLOR: YELLOW
CREAT SERPL-MCNC: 1.09 MG/DL
CREAT SPEC-SCNC: 117 MG/DL
EOSINOPHIL # BLD AUTO: 0.29 K/UL
EOSINOPHIL NFR BLD AUTO: 5.1 %
ERYTHROCYTE [SEDIMENTATION RATE] IN BLOOD BY WESTERGREN METHOD: 17 MM/HR
FOLATE SERPL-MCNC: 10.2 NG/ML
GGT SERPL-CCNC: 33 U/L
GLUCOSE QUALITATIVE U: NEGATIVE MG/DL
GLUCOSE SERPL-MCNC: 102 MG/DL
HBA1C MFR BLD HPLC: 6 %
HCT VFR BLD CALC: 43.3 %
HDLC SERPL-MCNC: 33 MG/DL
HGB BLD-MCNC: 14.4 G/DL
IMM GRANULOCYTES NFR BLD AUTO: 0.2 %
IRON SATN MFR SERPL: 21 %
IRON SERPL-MCNC: 79 UG/DL
KETONES URINE: NEGATIVE
LDLC SERPL CALC-MCNC: 104 MG/DL
LEUKOCYTE ESTERASE URINE: NEGATIVE
LYMPHOCYTES # BLD AUTO: 1.99 K/UL
LYMPHOCYTES NFR BLD AUTO: 35.3 %
MAN DIFF?: NORMAL
MCHC RBC-ENTMCNC: 25.9 PG
MCHC RBC-ENTMCNC: 33.3 GM/DL
MCV RBC AUTO: 77.9 FL
MICROALBUMIN 24H UR DL<=1MG/L-MCNC: 0.4 MG/DL
MICROALBUMIN/CREAT 24H UR-RTO: 3 MG/G
MONOCYTES # BLD AUTO: 0.43 K/UL
MONOCYTES NFR BLD AUTO: 7.6 %
NEUTROPHILS # BLD AUTO: 2.87 K/UL
NEUTROPHILS NFR BLD AUTO: 50.9 %
NITRITE URINE: NEGATIVE
PH URINE: 6
PLATELET # BLD AUTO: 328 K/UL
POTASSIUM SERPL-SCNC: 4.1 MMOL/L
PROT SERPL-MCNC: 7.9 G/DL
PROTEIN URINE: NEGATIVE MG/DL
RBC # BLD: 5.56 M/UL
RBC # FLD: 13.9 %
SODIUM SERPL-SCNC: 141 MMOL/L
SPECIFIC GRAVITY URINE: 1.02
T3 SERPL-MCNC: 129 NG/DL
T4 FREE SERPL-MCNC: 1.3 NG/DL
TIBC SERPL-MCNC: 381 UG/DL
TRIGL SERPL-MCNC: 126 MG/DL
TSH SERPL-ACNC: 1.92 UIU/ML
UIBC SERPL-MCNC: 302 UG/DL
UROBILINOGEN URINE: NEGATIVE MG/DL
VIT B12 SERPL-MCNC: 442 PG/ML
WBC # FLD AUTO: 5.64 K/UL

## 2018-06-02 ENCOUNTER — MOBILE ON CALL (OUTPATIENT)
Age: 38
End: 2018-06-02

## 2018-06-03 LAB — TOTAL IGE SMQN RAST: 54 KU/L

## 2020-06-01 ENCOUNTER — EMERGENCY (EMERGENCY)
Facility: HOSPITAL | Age: 40
LOS: 1 days | Discharge: HOME | End: 2020-06-01
Attending: EMERGENCY MEDICINE
Payer: COMMERCIAL

## 2020-06-01 VITALS
RESPIRATION RATE: 18 BRPM | SYSTOLIC BLOOD PRESSURE: 159 MMHG | DIASTOLIC BLOOD PRESSURE: 126 MMHG | HEIGHT: 67 IN | HEART RATE: 117 BPM | TEMPERATURE: 98 F | OXYGEN SATURATION: 97 % | WEIGHT: 184.97 LBS

## 2020-06-01 VITALS
OXYGEN SATURATION: 97 % | HEART RATE: 97 BPM | SYSTOLIC BLOOD PRESSURE: 153 MMHG | DIASTOLIC BLOOD PRESSURE: 116 MMHG | RESPIRATION RATE: 18 BRPM

## 2020-06-01 LAB
ALBUMIN SERPL ELPH-MCNC: 4.3 G/DL — SIGNIFICANT CHANGE UP (ref 3.3–5)
ALP SERPL-CCNC: 66 U/L — SIGNIFICANT CHANGE UP (ref 40–120)
ALT FLD-CCNC: 41 U/L — SIGNIFICANT CHANGE UP (ref 10–45)
ANION GAP SERPL CALC-SCNC: 14 MMOL/L — SIGNIFICANT CHANGE UP (ref 5–17)
APPEARANCE UR: CLEAR — SIGNIFICANT CHANGE UP
AST SERPL-CCNC: 25 U/L — SIGNIFICANT CHANGE UP (ref 10–40)
BASOPHILS # BLD AUTO: 0.06 K/UL — SIGNIFICANT CHANGE UP (ref 0–0.2)
BASOPHILS NFR BLD AUTO: 0.7 % — SIGNIFICANT CHANGE UP (ref 0–2)
BILIRUB SERPL-MCNC: 0.2 MG/DL — SIGNIFICANT CHANGE UP (ref 0.2–1.2)
BILIRUB UR-MCNC: NEGATIVE — SIGNIFICANT CHANGE UP
BUN SERPL-MCNC: 11 MG/DL — SIGNIFICANT CHANGE UP (ref 7–23)
CALCIUM SERPL-MCNC: 9.8 MG/DL — SIGNIFICANT CHANGE UP (ref 8.4–10.5)
CHLORIDE SERPL-SCNC: 99 MMOL/L — SIGNIFICANT CHANGE UP (ref 96–108)
CO2 SERPL-SCNC: 23 MMOL/L — SIGNIFICANT CHANGE UP (ref 22–31)
COLOR SPEC: COLORLESS — SIGNIFICANT CHANGE UP
CREAT SERPL-MCNC: 0.82 MG/DL — SIGNIFICANT CHANGE UP (ref 0.5–1.3)
DIFF PNL FLD: NEGATIVE — SIGNIFICANT CHANGE UP
EOSINOPHIL # BLD AUTO: 0.36 K/UL — SIGNIFICANT CHANGE UP (ref 0–0.5)
EOSINOPHIL NFR BLD AUTO: 4.2 % — SIGNIFICANT CHANGE UP (ref 0–6)
GLUCOSE SERPL-MCNC: 157 MG/DL — HIGH (ref 70–99)
GLUCOSE UR QL: NEGATIVE — SIGNIFICANT CHANGE UP
HCT VFR BLD CALC: 42.4 % — SIGNIFICANT CHANGE UP (ref 39–50)
HGB BLD-MCNC: 14 G/DL — SIGNIFICANT CHANGE UP (ref 13–17)
IMM GRANULOCYTES NFR BLD AUTO: 0.2 % — SIGNIFICANT CHANGE UP (ref 0–1.5)
KETONES UR-MCNC: NEGATIVE — SIGNIFICANT CHANGE UP
LEUKOCYTE ESTERASE UR-ACNC: NEGATIVE — SIGNIFICANT CHANGE UP
LYMPHOCYTES # BLD AUTO: 2.32 K/UL — SIGNIFICANT CHANGE UP (ref 1–3.3)
LYMPHOCYTES # BLD AUTO: 27 % — SIGNIFICANT CHANGE UP (ref 13–44)
MCHC RBC-ENTMCNC: 26.2 PG — LOW (ref 27–34)
MCHC RBC-ENTMCNC: 33 GM/DL — SIGNIFICANT CHANGE UP (ref 32–36)
MCV RBC AUTO: 79.3 FL — LOW (ref 80–100)
MONOCYTES # BLD AUTO: 0.89 K/UL — SIGNIFICANT CHANGE UP (ref 0–0.9)
MONOCYTES NFR BLD AUTO: 10.3 % — SIGNIFICANT CHANGE UP (ref 2–14)
NEUTROPHILS # BLD AUTO: 4.95 K/UL — SIGNIFICANT CHANGE UP (ref 1.8–7.4)
NEUTROPHILS NFR BLD AUTO: 57.6 % — SIGNIFICANT CHANGE UP (ref 43–77)
NITRITE UR-MCNC: NEGATIVE — SIGNIFICANT CHANGE UP
NRBC # BLD: 0 /100 WBCS — SIGNIFICANT CHANGE UP (ref 0–0)
PH UR: 6.5 — SIGNIFICANT CHANGE UP (ref 5–8)
PLATELET # BLD AUTO: 285 K/UL — SIGNIFICANT CHANGE UP (ref 150–400)
POTASSIUM SERPL-MCNC: 3.8 MMOL/L — SIGNIFICANT CHANGE UP (ref 3.5–5.3)
POTASSIUM SERPL-SCNC: 3.8 MMOL/L — SIGNIFICANT CHANGE UP (ref 3.5–5.3)
PROT SERPL-MCNC: 7.2 G/DL — SIGNIFICANT CHANGE UP (ref 6–8.3)
PROT UR-MCNC: NEGATIVE — SIGNIFICANT CHANGE UP
RBC # BLD: 5.35 M/UL — SIGNIFICANT CHANGE UP (ref 4.2–5.8)
RBC # FLD: 13.6 % — SIGNIFICANT CHANGE UP (ref 10.3–14.5)
SODIUM SERPL-SCNC: 136 MMOL/L — SIGNIFICANT CHANGE UP (ref 135–145)
SP GR SPEC: 1 — LOW (ref 1.01–1.02)
UROBILINOGEN FLD QL: NEGATIVE — SIGNIFICANT CHANGE UP
WBC # BLD: 8.6 K/UL — SIGNIFICANT CHANGE UP (ref 3.8–10.5)
WBC # FLD AUTO: 8.6 K/UL — SIGNIFICANT CHANGE UP (ref 3.8–10.5)

## 2020-06-01 PROCEDURE — 99284 EMERGENCY DEPT VISIT MOD MDM: CPT

## 2020-06-01 PROCEDURE — 99284 EMERGENCY DEPT VISIT MOD MDM: CPT | Mod: 25

## 2020-06-01 PROCEDURE — 81003 URINALYSIS AUTO W/O SCOPE: CPT

## 2020-06-01 PROCEDURE — 96374 THER/PROPH/DIAG INJ IV PUSH: CPT

## 2020-06-01 PROCEDURE — 85027 COMPLETE CBC AUTOMATED: CPT

## 2020-06-01 PROCEDURE — 80053 COMPREHEN METABOLIC PANEL: CPT

## 2020-06-01 RX ORDER — ONDANSETRON 8 MG/1
4 TABLET, FILM COATED ORAL ONCE
Refills: 0 | Status: DISCONTINUED | OUTPATIENT
Start: 2020-06-01 | End: 2020-06-01

## 2020-06-01 RX ORDER — METOCLOPRAMIDE HCL 10 MG
10 TABLET ORAL ONCE
Refills: 0 | Status: DISCONTINUED | OUTPATIENT
Start: 2020-06-01 | End: 2020-06-01

## 2020-06-01 RX ORDER — METOCLOPRAMIDE HCL 10 MG
10 TABLET ORAL ONCE
Refills: 0 | Status: COMPLETED | OUTPATIENT
Start: 2020-06-01 | End: 2020-06-01

## 2020-06-01 RX ORDER — AMLODIPINE BESYLATE 2.5 MG/1
1 TABLET ORAL
Qty: 14 | Refills: 0
Start: 2020-06-01 | End: 2020-06-14

## 2020-06-01 RX ORDER — ACETAMINOPHEN 500 MG
650 TABLET ORAL ONCE
Refills: 0 | Status: COMPLETED | OUTPATIENT
Start: 2020-06-01 | End: 2020-06-01

## 2020-06-01 RX ORDER — SODIUM CHLORIDE 9 MG/ML
1000 INJECTION INTRAMUSCULAR; INTRAVENOUS; SUBCUTANEOUS
Refills: 0 | Status: DISCONTINUED | OUTPATIENT
Start: 2020-06-01 | End: 2020-06-01

## 2020-06-01 RX ADMIN — Medication 650 MILLIGRAM(S): at 20:01

## 2020-06-01 RX ADMIN — Medication 10 MILLIGRAM(S): at 20:02

## 2020-06-01 NOTE — ED PROVIDER NOTE - CLINICAL SUMMARY MEDICAL DECISION MAKING FREE TEXT BOX
40 y/o M presenting with HA since this AM, likely tension. Low concern for SAH given gradual onset and lack of neuro deficits. Reglan and tylenol for symptoms, likely DC home with PMD f/u for BP control - Peter Ozuna DO PGY-1

## 2020-06-01 NOTE — ED PROVIDER NOTE - OBJECTIVE STATEMENT
40 y/o M with no reported PMH presenting with HA since 10 AM. States headache has become gradually worse. Has hx of HA in past and states these are similar in nature but today has lasted longer than usual. Did not find relief with Aleve. States he spoke to father who told patient to check his BP which was elevated. Patient states he does not routinely check BP. Denies n/v/d, changes in vision, cp, sob, neck stiffness.

## 2020-06-01 NOTE — ED ADULT NURSE NOTE - OBJECTIVE STATEMENT
40 y/o Male presenting to the ED ambulatory, A&Ox3, complaining of headache in the back of his head and behind his eyes, pt reports taking his BP at home and finding it to be high, took it again at a [place] office where his wife works and it was high again. Pt came here hypertensive and tachycardiac. Pt reports the headache was sudden, reports it is a 6/10. Denies CP, SOB, cough, fever, chills, N/V/D, blurry vision, dizziness, numbness, tingling, back pain, abdominal pain. Pt denies hx of HTN. Hx of headaches in the past, reports this feels similar. Took aleve at home with slight relief. Pt appears well, in no current distress. Safety and comfort measures provided, bed locked and in lowest position, side rails up for safety. Call bell within reach. Awaiting MD orders for RN interventions.

## 2020-06-01 NOTE — ED ADULT TRIAGE NOTE - CHIEF COMPLAINT QUOTE
high bp 170/130 today, c/o headache, denies fever, cough, vision changes, dizziness. Denies covid exposure.

## 2020-06-01 NOTE — ED PROVIDER NOTE - ATTENDING CONTRIBUTION TO CARE
Attending MD Summers:  I personally have seen and examined this patient.  Resident note reviewed and agree on plan of care and except where noted.  See HPI, PE, and MDM for details.    **THIS PATIENT WAS EVALUATED AND TREATED DURING THE COVID-19 PANDEMIC IN [place], [place]**      39M presenting with bifrontal headache x 1 day and elevated BP. BPs 160s/130s on arrival here, keen mental status, normal neurologic exam. Patient endorses that he occasionally gets headaches and his current headache is not unusual for him, do not suspect SAH, mass or acute intracranial pathology in this patient. Will pursue analgesia for headache, screening labs to evaluate renal function, screening UA. I do not suspect malignant htn in this patient thus no need for urgent reduction in BP. Will reassess BP after analgesia, consider PO anti-hypertensives if BP remains elevated for gradual reduction in BP

## 2020-06-01 NOTE — ED PROVIDER NOTE - PATIENT PORTAL LINK FT
You can access the FollowMyHealth Patient Portal offered by [facility] by registering at the following website: [email or web address]. By joining [facility]’s FollowMyHealth portal, you will also be able to view your health information using other applications (apps) compatible with our system.

## 2020-06-01 NOTE — ED PROVIDER NOTE - NS ED ROS FT
CONSTITUTIONAL: No fevers, no chills, no lightheadedness, no dizziness  Eyes: no visual changes  Ears: no ear drainage, no ear pain  Nose: no nasal congestion  Mouth/Throat: no sore throat  CV: No chest pain, no palpitations  PULM: No SOB, no cough  GI: No n/v/d, no abd pain  : no dysuria, no hematuria  SKIN: no rashes.  NEURO: +headache, no focal weakness or numbness  LYMPH/VASC: no LE swelling

## 2020-06-01 NOTE — ED ADULT NURSE NOTE - CAS DISCH CONDITION
Pt reports headache decreased prior to discharge. As per MD Ozuna pt ok to go home with elevated BP. Pt denies dizziness, blurry vision before discharge. Steady ambulation noted./Stable

## 2020-06-02 ENCOUNTER — TRANSCRIPTION ENCOUNTER (OUTPATIENT)
Age: 40
End: 2020-06-02

## 2020-06-09 ENCOUNTER — NON-APPOINTMENT (OUTPATIENT)
Age: 40
End: 2020-06-09

## 2020-06-09 ENCOUNTER — APPOINTMENT (OUTPATIENT)
Dept: INTERNAL MEDICINE | Facility: CLINIC | Age: 40
End: 2020-06-09
Payer: COMMERCIAL

## 2020-06-09 VITALS
SYSTOLIC BLOOD PRESSURE: 140 MMHG | HEIGHT: 68 IN | RESPIRATION RATE: 17 BRPM | WEIGHT: 200 LBS | OXYGEN SATURATION: 98 % | HEART RATE: 78 BPM | BODY MASS INDEX: 30.31 KG/M2 | DIASTOLIC BLOOD PRESSURE: 96 MMHG | TEMPERATURE: 97.8 F

## 2020-06-09 PROCEDURE — 93000 ELECTROCARDIOGRAM COMPLETE: CPT

## 2020-06-09 PROCEDURE — 99396 PREV VISIT EST AGE 40-64: CPT | Mod: 25

## 2020-06-09 NOTE — HEALTH RISK ASSESSMENT
[Good] : ~his/her~  mood as  good [Intercurrent ED visits] : went to ED [No] : In the past 12 months have you used drugs other than those required for medical reasons? No [No falls in past year] : Patient reported no falls in the past year [0] : 2) Feeling down, depressed, or hopeless: Not at all (0) [HIV test declined] : HIV test declined [None] : None [Feels Safe at Home] : Feels safe at home [Fully functional (bathing, dressing, toileting, transferring, walking, feeding)] : Fully functional (bathing, dressing, toileting, transferring, walking, feeding) [Fully functional (using the telephone, shopping, preparing meals, housekeeping, doing laundry, using] : Fully functional and needs no help or supervision to perform IADLs (using the telephone, shopping, preparing meals, housekeeping, doing laundry, using transportation, managing medications and managing finances) [Smoke Detector] : smoke detector [Carbon Monoxide Detector] : carbon monoxide detector [Seat Belt] :  uses seat belt [Sunscreen] : uses sunscreen [With Patient/Caregiver] : With Patient/Caregiver [FreeTextEntry1] : Check up\par  [] : No [de-identified] : 06/20 [[id number]] : 0 [de-identified] : None [Change in mental status noted] : No change in mental status noted [Reports changes in hearing] : Reports no changes in hearing [Reports changes in vision] : Reports no changes in vision [Reports changes in dental health] : Reports no changes in dental health [AdvancecareDate] : 06/20

## 2020-06-09 NOTE — HISTORY OF PRESENT ILLNESS
[de-identified] : 39 year old male patient with history of stable Hypertension, Elevated Hemoglobin A1c, Hyperlipidemia, GERD, Hyperlipidemia, Vitamin D Deficiency, history as stated, presented for an annual preventative examination. Patient denies any associated symptoms of shortness of breath, chest pain, abdominal pain at this time.\par \par

## 2020-06-09 NOTE — ASSESSMENT
[FreeTextEntry1] : 39 year old male found to have stable Hypertension, Elevated Hemoglobin A1c, Hyperlipidemia, GERD, Hyperlipidemia, Vitamin D Deficiency,with the current regimen, diet and life style modifications, as counseled. Prior results reviewed and discussed with the patient during today's examination. Plan as ordered.\par EKG showed NSR at the rate of 91 BPM, non-sp ST-T changes noted.\par Patient was recently evaluated in ER, findings and recommendations reviewed with the patient during today's examination.\par

## 2020-06-11 LAB
25(OH)D3 SERPL-MCNC: 11.8 NG/ML
ALBUMIN SERPL ELPH-MCNC: 4.8 G/DL
ALP BLD-CCNC: 70 U/L
ALT SERPL-CCNC: 35 U/L
ANION GAP SERPL CALC-SCNC: 15 MMOL/L
APPEARANCE: CLEAR
AST SERPL-CCNC: 23 U/L
BASOPHILS # BLD AUTO: 0.08 K/UL
BASOPHILS NFR BLD AUTO: 1.3 %
BILIRUB SERPL-MCNC: 0.4 MG/DL
BILIRUBIN URINE: NEGATIVE
BLOOD URINE: NEGATIVE
BUN SERPL-MCNC: 10 MG/DL
CALCIUM SERPL-MCNC: 10 MG/DL
CHLORIDE SERPL-SCNC: 100 MMOL/L
CHOLEST SERPL-MCNC: 210 MG/DL
CHOLEST/HDLC SERPL: 5.7 RATIO
CO2 SERPL-SCNC: 24 MMOL/L
COLOR: COLORLESS
CREAT SERPL-MCNC: 0.88 MG/DL
CREAT SPEC-SCNC: 70 MG/DL
EOSINOPHIL # BLD AUTO: 0.3 K/UL
EOSINOPHIL NFR BLD AUTO: 4.7 %
ERYTHROCYTE [SEDIMENTATION RATE] IN BLOOD BY WESTERGREN METHOD: 31 MM/HR
ESTIMATED AVERAGE GLUCOSE: 134 MG/DL
FOLATE SERPL-MCNC: 9.4 NG/ML
GGT SERPL-CCNC: 38 U/L
GLUCOSE QUALITATIVE U: NEGATIVE
GLUCOSE SERPL-MCNC: 124 MG/DL
HBA1C MFR BLD HPLC: 6.3 %
HCT VFR BLD CALC: 45.6 %
HDLC SERPL-MCNC: 37 MG/DL
HGB BLD-MCNC: 14.7 G/DL
IMM GRANULOCYTES NFR BLD AUTO: 0.3 %
IRON SATN MFR SERPL: 32 %
IRON SERPL-MCNC: 125 UG/DL
KETONES URINE: NEGATIVE
LDLC SERPL CALC-MCNC: 128 MG/DL
LEUKOCYTE ESTERASE URINE: NEGATIVE
LYMPHOCYTES # BLD AUTO: 2.31 K/UL
LYMPHOCYTES NFR BLD AUTO: 36.2 %
MAN DIFF?: NORMAL
MCHC RBC-ENTMCNC: 26.1 PG
MCHC RBC-ENTMCNC: 32.2 GM/DL
MCV RBC AUTO: 81 FL
MICROALBUMIN 24H UR DL<=1MG/L-MCNC: <1.2 MG/DL
MICROALBUMIN/CREAT 24H UR-RTO: NORMAL MG/G
MONOCYTES # BLD AUTO: 0.63 K/UL
MONOCYTES NFR BLD AUTO: 9.9 %
NEUTROPHILS # BLD AUTO: 3.04 K/UL
NEUTROPHILS NFR BLD AUTO: 47.6 %
NITRITE URINE: NEGATIVE
PH URINE: 6.5
PLATELET # BLD AUTO: 324 K/UL
POTASSIUM SERPL-SCNC: 4.5 MMOL/L
PROT SERPL-MCNC: 7.1 G/DL
PROTEIN URINE: NEGATIVE
RBC # BLD: 5.63 M/UL
RBC # FLD: 13.2 %
SARS-COV-2 IGG SERPL IA-ACNC: <0.1 INDEX
SARS-COV-2 IGG SERPL QL IA: NEGATIVE
SODIUM SERPL-SCNC: 139 MMOL/L
SPECIFIC GRAVITY URINE: 1.01
T3 SERPL-MCNC: 148 NG/DL
T4 FREE SERPL-MCNC: 1.3 NG/DL
TIBC SERPL-MCNC: 386 UG/DL
TRIGL SERPL-MCNC: 227 MG/DL
TSH SERPL-ACNC: 2.57 UIU/ML
UIBC SERPL-MCNC: 261 UG/DL
UROBILINOGEN URINE: NORMAL
VIT B12 SERPL-MCNC: 383 PG/ML
WBC # FLD AUTO: 6.38 K/UL

## 2020-08-06 ENCOUNTER — NON-APPOINTMENT (OUTPATIENT)
Age: 40
End: 2020-08-06

## 2020-08-06 ENCOUNTER — APPOINTMENT (OUTPATIENT)
Dept: INTERNAL MEDICINE | Facility: CLINIC | Age: 40
End: 2020-08-06
Payer: COMMERCIAL

## 2020-08-06 VITALS
DIASTOLIC BLOOD PRESSURE: 85 MMHG | TEMPERATURE: 97.7 F | WEIGHT: 186 LBS | SYSTOLIC BLOOD PRESSURE: 122 MMHG | HEART RATE: 91 BPM | RESPIRATION RATE: 16 BRPM | BODY MASS INDEX: 28.19 KG/M2 | HEIGHT: 68 IN | OXYGEN SATURATION: 100 %

## 2020-08-06 PROCEDURE — 93000 ELECTROCARDIOGRAM COMPLETE: CPT

## 2020-08-06 PROCEDURE — 99214 OFFICE O/P EST MOD 30 MIN: CPT | Mod: 25

## 2020-08-06 RX ORDER — AMLODIPINE BESYLATE 10 MG/1
10 TABLET ORAL DAILY
Qty: 30 | Refills: 5 | Status: DISCONTINUED | COMMUNITY
End: 2020-08-06

## 2020-08-06 NOTE — HISTORY OF PRESENT ILLNESS
[de-identified] : 39 year old  male patient with history of stable Hypertension, Elevated Hemoglobin A1c, Hyperlipidemia, GERD, Hyperlipidemia, Vitamin D Deficiency, history as stated, presented for follow up examination. Patient is compliant with all medications. Denies shortness of breath or abdominal pains at this time. ROS as stated.\par

## 2020-08-06 NOTE — HEALTH RISK ASSESSMENT
[No] : In the past 12 months have you used drugs other than those required for medical reasons? No [No falls in past year] : Patient reported no falls in the past year [0] : 1) Little interest or pleasure doing things: Not at all (0) [de-identified] : None [] : No [[id number]] : 0

## 2020-08-06 NOTE — ASSESSMENT
[FreeTextEntry1] : 39 year old male found to have stable Hypertension, Elevated Hemoglobin A1c, Hyperlipidemia, GERD, Hyperlipidemia, Vitamin D Deficiency,with the current regimen, diet and life style modifications, as counseled. Prior results reviewed and discussed with the patient during today's examination. Plan as ordered.\par Current symptoms are consistent with Atypical Chest Pain, possible GERD induced Sx, as counseled, CARD F/U, as directed.\par EKG showed NSR at the rate of 74 BPM, non-sp ST-T changes noted.\par

## 2020-08-07 LAB
ALBUMIN SERPL ELPH-MCNC: 4.7 G/DL
ALP BLD-CCNC: 71 U/L
ALT SERPL-CCNC: 23 U/L
ANION GAP SERPL CALC-SCNC: 13 MMOL/L
AST SERPL-CCNC: 16 U/L
BASOPHILS # BLD AUTO: 0.06 K/UL
BASOPHILS NFR BLD AUTO: 1 %
BILIRUB SERPL-MCNC: 0.6 MG/DL
BUN SERPL-MCNC: 14 MG/DL
CALCIUM SERPL-MCNC: 10 MG/DL
CHLORIDE SERPL-SCNC: 102 MMOL/L
CHOLEST SERPL-MCNC: 153 MG/DL
CHOLEST/HDLC SERPL: 4.6 RATIO
CO2 SERPL-SCNC: 26 MMOL/L
CREAT SERPL-MCNC: 1.05 MG/DL
EOSINOPHIL # BLD AUTO: 0.17 K/UL
EOSINOPHIL NFR BLD AUTO: 2.9 %
ESTIMATED AVERAGE GLUCOSE: 120 MG/DL
GGT SERPL-CCNC: 23 U/L
GLUCOSE SERPL-MCNC: 116 MG/DL
HBA1C MFR BLD HPLC: 5.8 %
HCT VFR BLD CALC: 43.1 %
HDLC SERPL-MCNC: 33 MG/DL
HGB BLD-MCNC: 13.5 G/DL
IMM GRANULOCYTES NFR BLD AUTO: 0.2 %
LDLC SERPL CALC-MCNC: 100 MG/DL
LYMPHOCYTES # BLD AUTO: 1.58 K/UL
LYMPHOCYTES NFR BLD AUTO: 26.9 %
MAN DIFF?: NORMAL
MCHC RBC-ENTMCNC: 25.9 PG
MCHC RBC-ENTMCNC: 31.3 GM/DL
MCV RBC AUTO: 82.6 FL
MONOCYTES # BLD AUTO: 0.53 K/UL
MONOCYTES NFR BLD AUTO: 9 %
NEUTROPHILS # BLD AUTO: 3.52 K/UL
NEUTROPHILS NFR BLD AUTO: 60 %
PLATELET # BLD AUTO: 297 K/UL
POTASSIUM SERPL-SCNC: 4.7 MMOL/L
PROT SERPL-MCNC: 7.1 G/DL
RBC # BLD: 5.22 M/UL
RBC # FLD: 13.6 %
SARS-COV-2 IGG SERPL IA-ACNC: 0.01 INDEX
SARS-COV-2 IGG SERPL QL IA: NEGATIVE
SODIUM SERPL-SCNC: 142 MMOL/L
TRIGL SERPL-MCNC: 99 MG/DL
WBC # FLD AUTO: 5.87 K/UL

## 2020-09-01 ENCOUNTER — APPOINTMENT (OUTPATIENT)
Dept: CARDIOLOGY | Facility: CLINIC | Age: 40
End: 2020-09-01
Payer: COMMERCIAL

## 2020-09-01 VITALS
BODY MASS INDEX: 28.19 KG/M2 | WEIGHT: 186 LBS | TEMPERATURE: 97.7 F | HEIGHT: 68 IN | SYSTOLIC BLOOD PRESSURE: 118 MMHG | OXYGEN SATURATION: 100 % | HEART RATE: 96 BPM | DIASTOLIC BLOOD PRESSURE: 80 MMHG

## 2020-09-01 PROCEDURE — 93000 ELECTROCARDIOGRAM COMPLETE: CPT

## 2020-09-01 PROCEDURE — 99204 OFFICE O/P NEW MOD 45 MIN: CPT

## 2020-09-01 NOTE — ASSESSMENT
[FreeTextEntry1] : 38 yo M with HTN, HLD, and GERD who presents for evaluation of chest discomfort.\par \par 1. Chest discomfort: Has both typical and atypical components. Patient is at intermediate pretest probability of CAD and has baseline ST changes on his EKG; therefore will send him for exercise nuclear stress test for further ischemic evaluation\par -Will also send for echocardiogram to assess for WMA as well as structural heart disease \par \par 2. HTN: Appears well controlled on amlodipine\par -Will assess for LVH with echocardiogram\par \par 3. HLD: Appears well controlled with dietary interventions at this point\par \par Will call patient with results of testing at [phone], OK to leave .

## 2020-09-01 NOTE — HISTORY OF PRESENT ILLNESS
[FreeTextEntry1] : 40 yo M with HTN, HLD, and GERD who presents for evaluation of chest discomfort. Patient reports left sided chest pain, intermittent, radiating to the back and left arm, squeezing in nature. Symptoms became worse this past Saturday. They are not related to activity. No associated dyspnea, LH, palpitations, or syncope. Patient had some mild chest pain at the time of EKG performed today in the office. Denies LE edema, orthopnea, or PND.\par

## 2020-09-17 ENCOUNTER — OUTPATIENT (OUTPATIENT)
Dept: OUTPATIENT SERVICES | Facility: HOSPITAL | Age: 40
LOS: 1 days | End: 2020-09-17
Payer: COMMERCIAL

## 2020-09-17 ENCOUNTER — RESULT REVIEW (OUTPATIENT)
Age: 40
End: 2020-09-17

## 2020-09-17 DIAGNOSIS — R07.9 CHEST PAIN, UNSPECIFIED: ICD-10-CM

## 2020-09-17 PROCEDURE — 93018 CV STRESS TEST I&R ONLY: CPT

## 2020-09-17 PROCEDURE — 78452 HT MUSCLE IMAGE SPECT MULT: CPT

## 2020-09-17 PROCEDURE — 93306 TTE W/DOPPLER COMPLETE: CPT | Mod: 26

## 2020-09-17 PROCEDURE — 93017 CV STRESS TEST TRACING ONLY: CPT

## 2020-09-17 PROCEDURE — 93016 CV STRESS TEST SUPVJ ONLY: CPT

## 2020-09-17 PROCEDURE — 93306 TTE W/DOPPLER COMPLETE: CPT

## 2020-09-17 PROCEDURE — 78452 HT MUSCLE IMAGE SPECT MULT: CPT | Mod: 26

## 2020-09-17 PROCEDURE — A9502: CPT

## 2020-09-24 ENCOUNTER — APPOINTMENT (OUTPATIENT)
Dept: INTERNAL MEDICINE | Facility: CLINIC | Age: 40
End: 2020-09-24

## 2020-09-29 LAB
ANION GAP SERPL CALC-SCNC: 14 MMOL/L
BUN SERPL-MCNC: 12 MG/DL
CALCIUM SERPL-MCNC: 10.2 MG/DL
CHLORIDE SERPL-SCNC: 100 MMOL/L
CO2 SERPL-SCNC: 28 MMOL/L
CREAT SERPL-MCNC: 1.03 MG/DL
GLUCOSE SERPL-MCNC: 119 MG/DL
POTASSIUM SERPL-SCNC: 4.1 MMOL/L
SODIUM SERPL-SCNC: 141 MMOL/L

## 2020-10-02 ENCOUNTER — OUTPATIENT (OUTPATIENT)
Dept: OUTPATIENT SERVICES | Facility: HOSPITAL | Age: 40
LOS: 1 days | End: 2020-10-02
Payer: COMMERCIAL

## 2020-10-02 ENCOUNTER — APPOINTMENT (OUTPATIENT)
Dept: CARDIOLOGY | Facility: CLINIC | Age: 40
End: 2020-10-02

## 2020-10-02 ENCOUNTER — RESULT REVIEW (OUTPATIENT)
Age: 40
End: 2020-10-02

## 2020-10-02 DIAGNOSIS — R94.39 ABNORMAL RESULT OF OTHER CARDIOVASCULAR FUNCTION STUDY: ICD-10-CM

## 2020-10-02 PROCEDURE — 75574 CT ANGIO HRT W/3D IMAGE: CPT | Mod: 26

## 2020-10-02 PROCEDURE — 75574 CT ANGIO HRT W/3D IMAGE: CPT

## 2020-11-05 ENCOUNTER — APPOINTMENT (OUTPATIENT)
Dept: INTERNAL MEDICINE | Facility: CLINIC | Age: 40
End: 2020-11-05
Payer: COMMERCIAL

## 2020-11-05 VITALS
HEART RATE: 90 BPM | HEIGHT: 66 IN | DIASTOLIC BLOOD PRESSURE: 93 MMHG | SYSTOLIC BLOOD PRESSURE: 146 MMHG | TEMPERATURE: 98.1 F | RESPIRATION RATE: 16 BRPM | BODY MASS INDEX: 29.89 KG/M2 | OXYGEN SATURATION: 97 % | WEIGHT: 186 LBS

## 2020-11-05 DIAGNOSIS — Z92.29 PERSONAL HISTORY OF OTHER DRUG THERAPY: ICD-10-CM

## 2020-11-05 DIAGNOSIS — E78.6 LIPOPROTEIN DEFICIENCY: ICD-10-CM

## 2020-11-05 PROCEDURE — 99214 OFFICE O/P EST MOD 30 MIN: CPT | Mod: 25

## 2020-11-05 PROCEDURE — 99072 ADDL SUPL MATRL&STAF TM PHE: CPT

## 2020-11-05 PROCEDURE — 90686 IIV4 VACC NO PRSV 0.5 ML IM: CPT

## 2020-11-05 PROCEDURE — G0008: CPT

## 2020-11-05 NOTE — HISTORY OF PRESENT ILLNESS
[de-identified] : 39 year old  male patient with history of stable Hypertension, Hyperlipidemia, Elevated Hemoglobin A1c, Low HDL, Vitamin D Deficiency, history as stated, presented for follow up examination. Patient is compliant with all medications. Denies shortness of breath, chest pain or abdominal pains at this time. ROS as stated.\par

## 2020-11-05 NOTE — HEALTH RISK ASSESSMENT
[No] : In the past 12 months have you used drugs other than those required for medical reasons? No [No falls in past year] : Patient reported no falls in the past year [0] : 2) Feeling down, depressed, or hopeless: Not at all (0) [] : No [de-identified] : CARD [[id number]] : 0

## 2020-11-05 NOTE — ASSESSMENT
[FreeTextEntry1] : 39 year old male found to have stable Hypertension, Hyperlipidemia, Elevated Hemoglobin A1c, Low HDL, Vitamin D Deficiency,with the current regimen, diet and life style modifications, as counseled. Prior results reviewed and discussed with the patient during today's examination. Plan as ordered.\par Supplemental history was obtained from wife, who is accompanying the patient for today's examination.\par Patient was recently evaluated by CARD , findings and recommendations reviewed with the patient during today's examination.\par

## 2020-11-15 LAB
ALBUMIN SERPL ELPH-MCNC: 4.5 G/DL
ALP BLD-CCNC: 85 U/L
ALT SERPL-CCNC: 24 U/L
ANION GAP SERPL CALC-SCNC: 13 MMOL/L
AST SERPL-CCNC: 17 U/L
BASOPHILS # BLD AUTO: 0.06 K/UL
BASOPHILS NFR BLD AUTO: 1 %
BILIRUB SERPL-MCNC: 0.4 MG/DL
BUN SERPL-MCNC: 9 MG/DL
CALCIUM SERPL-MCNC: 9.9 MG/DL
CHLORIDE SERPL-SCNC: 103 MMOL/L
CHOLEST SERPL-MCNC: 111 MG/DL
CO2 SERPL-SCNC: 26 MMOL/L
CREAT SERPL-MCNC: 0.95 MG/DL
EOSINOPHIL # BLD AUTO: 0.25 K/UL
EOSINOPHIL NFR BLD AUTO: 4 %
ESTIMATED AVERAGE GLUCOSE: 134 MG/DL
GGT SERPL-CCNC: 27 U/L
GLUCOSE SERPL-MCNC: 97 MG/DL
HBA1C MFR BLD HPLC: 6.3 %
HCT VFR BLD CALC: 45.8 %
HDLC SERPL-MCNC: 35 MG/DL
HGB BLD-MCNC: 14.3 G/DL
IMM GRANULOCYTES NFR BLD AUTO: 0.2 %
LDLC SERPL CALC-MCNC: 62 MG/DL
LYMPHOCYTES # BLD AUTO: 2.29 K/UL
LYMPHOCYTES NFR BLD AUTO: 36.4 %
MAN DIFF?: NORMAL
MCHC RBC-ENTMCNC: 25.5 PG
MCHC RBC-ENTMCNC: 31.2 GM/DL
MCV RBC AUTO: 81.8 FL
MONOCYTES # BLD AUTO: 0.63 K/UL
MONOCYTES NFR BLD AUTO: 10 %
NEUTROPHILS # BLD AUTO: 3.05 K/UL
NEUTROPHILS NFR BLD AUTO: 48.4 %
NONHDLC SERPL-MCNC: 76 MG/DL
PLATELET # BLD AUTO: 324 K/UL
POTASSIUM SERPL-SCNC: 4.2 MMOL/L
PROT SERPL-MCNC: 7.2 G/DL
RBC # BLD: 5.6 M/UL
RBC # FLD: 13.2 %
SODIUM SERPL-SCNC: 142 MMOL/L
TRIGL SERPL-MCNC: 69 MG/DL
WBC # FLD AUTO: 6.29 K/UL

## 2020-12-19 ENCOUNTER — TRANSCRIPTION ENCOUNTER (OUTPATIENT)
Age: 40
End: 2020-12-19

## 2020-12-21 ENCOUNTER — NON-APPOINTMENT (OUTPATIENT)
Age: 40
End: 2020-12-21

## 2021-02-25 DIAGNOSIS — U07.1 COVID-19: ICD-10-CM

## 2021-02-27 LAB — SARS-COV-2 N GENE NPH QL NAA+PROBE: DETECTED

## 2021-03-31 ENCOUNTER — TRANSCRIPTION ENCOUNTER (OUTPATIENT)
Age: 41
End: 2021-03-31

## 2021-04-01 ENCOUNTER — NON-APPOINTMENT (OUTPATIENT)
Age: 41
End: 2021-04-01

## 2021-04-14 ENCOUNTER — NON-APPOINTMENT (OUTPATIENT)
Age: 41
End: 2021-04-14

## 2021-04-14 ENCOUNTER — APPOINTMENT (OUTPATIENT)
Dept: INTERNAL MEDICINE | Facility: CLINIC | Age: 41
End: 2021-04-14
Payer: COMMERCIAL

## 2021-04-14 PROCEDURE — 99072 ADDL SUPL MATRL&STAF TM PHE: CPT

## 2021-04-14 PROCEDURE — 99214 OFFICE O/P EST MOD 30 MIN: CPT

## 2021-04-14 NOTE — HEALTH RISK ASSESSMENT
[] : No [No] : In the past 12 months have you used drugs other than those required for medical reasons? No [No falls in past year] : Patient reported no falls in the past year [0] : 2) Feeling down, depressed, or hopeless: Not at all (0) [[id number]] : 0

## 2021-04-14 NOTE — ASSESSMENT
[FreeTextEntry1] : 39 yo with atypical L.arm pain/c.p.\par PMH,FH+ for CV risk factors.\par PE unremarkable.\par ECG:RSR,poss.QS in V1\par will consult .

## 2021-04-14 NOTE — HISTORY OF PRESENT ILLNESS
[FreeTextEntry8] : 39 yo c/o L.arm pain,from wrist to shoulder since yesterday.the pain is constant,non positional,not on motion,not exertional,not ass.with c.p.,sob,sweats,palpitations,amaurosis,dizziness,syncope.the pain is of mild intensity,took Tylenol yesterday.\par PMH:mild HTN,on Amlodipine 10 mg.HLP, with low HDL,LDL<70,on statin.\par h/o pre DM,last A1C 6,3% 6 months ago,on diet.\par FH+CAD\par card.evaluation for CV riisk factors neg.for IHD,6-7 months ago.\par works as [employer] .\par  [Spouse] : spouse

## 2021-04-14 NOTE — COUNSELING
[FreeTextEntry2] : ADA diet,low chol [de-identified] : healthy eating,exercise [None] : None [Good understanding] : Patient has a good understanding of lifestyle changes and steps needed to achieve self management goal

## 2021-04-15 ENCOUNTER — NON-APPOINTMENT (OUTPATIENT)
Age: 41
End: 2021-04-15

## 2021-04-15 ENCOUNTER — TRANSCRIPTION ENCOUNTER (OUTPATIENT)
Age: 41
End: 2021-04-15

## 2021-04-17 LAB
APPEARANCE: CLEAR
BASOPHILS # BLD AUTO: 0.06 K/UL
BASOPHILS NFR BLD AUTO: 1 %
BILIRUBIN URINE: NEGATIVE
BLOOD URINE: NEGATIVE
COLOR: COLORLESS
CREAT SPEC-SCNC: 70 MG/DL
EOSINOPHIL # BLD AUTO: 0.23 K/UL
EOSINOPHIL NFR BLD AUTO: 3.7 %
ERYTHROCYTE [SEDIMENTATION RATE] IN BLOOD BY WESTERGREN METHOD: 6 MM/HR
ESTIMATED AVERAGE GLUCOSE: 131 MG/DL
GLUCOSE QUALITATIVE U: NEGATIVE
HBA1C MFR BLD HPLC: 6.2 %
HCT VFR BLD CALC: 42.8 %
HGB BLD-MCNC: 14.2 G/DL
IMM GRANULOCYTES NFR BLD AUTO: 0.2 %
KETONES URINE: NEGATIVE
LEUKOCYTE ESTERASE URINE: NEGATIVE
LYMPHOCYTES # BLD AUTO: 1.87 K/UL
LYMPHOCYTES NFR BLD AUTO: 30.4 %
MAN DIFF?: NORMAL
MCHC RBC-ENTMCNC: 26.2 PG
MCHC RBC-ENTMCNC: 33.2 GM/DL
MCV RBC AUTO: 78.8 FL
MICROALBUMIN 24H UR DL<=1MG/L-MCNC: <1.2 MG/DL
MICROALBUMIN/CREAT 24H UR-RTO: NORMAL MG/G
MONOCYTES # BLD AUTO: 0.72 K/UL
MONOCYTES NFR BLD AUTO: 11.7 %
NEUTROPHILS # BLD AUTO: 3.27 K/UL
NEUTROPHILS NFR BLD AUTO: 53 %
NITRITE URINE: NEGATIVE
PH URINE: 7
PLATELET # BLD AUTO: 337 K/UL
PROTEIN URINE: NEGATIVE
RBC # BLD: 5.43 M/UL
RBC # FLD: 13.8 %
SPECIFIC GRAVITY URINE: 1.01
UROBILINOGEN URINE: NORMAL
WBC # FLD AUTO: 6.16 K/UL

## 2021-04-19 ENCOUNTER — APPOINTMENT (OUTPATIENT)
Dept: CARDIOLOGY | Facility: CLINIC | Age: 41
End: 2021-04-19
Payer: COMMERCIAL

## 2021-04-19 ENCOUNTER — APPOINTMENT (OUTPATIENT)
Dept: INTERNAL MEDICINE | Facility: CLINIC | Age: 41
End: 2021-04-19
Payer: COMMERCIAL

## 2021-04-19 VITALS
OXYGEN SATURATION: 97 % | TEMPERATURE: 97.9 F | HEIGHT: 66 IN | SYSTOLIC BLOOD PRESSURE: 123 MMHG | HEART RATE: 84 BPM | RESPIRATION RATE: 16 BRPM | BODY MASS INDEX: 30.53 KG/M2 | WEIGHT: 190 LBS | DIASTOLIC BLOOD PRESSURE: 81 MMHG

## 2021-04-19 DIAGNOSIS — M54.12 RADICULOPATHY, CERVICAL REGION: ICD-10-CM

## 2021-04-19 PROCEDURE — 99214 OFFICE O/P EST MOD 30 MIN: CPT

## 2021-04-19 PROCEDURE — 99072 ADDL SUPL MATRL&STAF TM PHE: CPT

## 2021-04-19 NOTE — HEALTH RISK ASSESSMENT
[Intercurrent Urgi Care visits] : went to urgent care [No] : In the past 12 months have you used drugs other than those required for medical reasons? No [No falls in past year] : Patient reported no falls in the past year [0] : 2) Feeling down, depressed, or hopeless: Not at all (0) [] : No [de-identified] : 04/21 [[id number]] : 0

## 2021-04-19 NOTE — HISTORY OF PRESENT ILLNESS
[de-identified] : 40 year old  male patient with history of stable Hypertension, Hyperlipidemia, Elevated Hemoglobin A1c, GERD, history as stated, presented for follow up examination with C/O intermittent elevation of BP, associated with left arm pain and weakness. Patient is compliant with all medications. Denies shortness of breath, abdominal pains at this time. ROS as stated.\par

## 2021-04-20 LAB
25(OH)D3 SERPL-MCNC: 19.4 NG/ML
ALBUMIN SERPL ELPH-MCNC: 4.7 G/DL
ALP BLD-CCNC: 80 U/L
ALT SERPL-CCNC: 37 U/L
ANION GAP SERPL CALC-SCNC: 12 MMOL/L
AST SERPL-CCNC: 19 U/L
BILIRUB SERPL-MCNC: 0.6 MG/DL
BUN SERPL-MCNC: 11 MG/DL
CALCIUM SERPL-MCNC: 9.7 MG/DL
CHLORIDE SERPL-SCNC: 100 MMOL/L
CHOLEST SERPL-MCNC: 163 MG/DL
CO2 SERPL-SCNC: 27 MMOL/L
CREAT SERPL-MCNC: 0.95 MG/DL
FOLATE SERPL-MCNC: >20 NG/ML
GGT SERPL-CCNC: 36 U/L
GLUCOSE SERPL-MCNC: 108 MG/DL
HDLC SERPL-MCNC: 36 MG/DL
IRON SATN MFR SERPL: 33 %
IRON SERPL-MCNC: 127 UG/DL
LDLC SERPL CALC-MCNC: 98 MG/DL
NONHDLC SERPL-MCNC: 127 MG/DL
POTASSIUM SERPL-SCNC: 3.7 MMOL/L
PROT SERPL-MCNC: 7.1 G/DL
SODIUM SERPL-SCNC: 140 MMOL/L
T3 SERPL-MCNC: 142 NG/DL
T4 FREE SERPL-MCNC: 1.3 NG/DL
TIBC SERPL-MCNC: 381 UG/DL
TRIGL SERPL-MCNC: 143 MG/DL
TSH SERPL-ACNC: 1.42 UIU/ML
UIBC SERPL-MCNC: 254 UG/DL
VIT B12 SERPL-MCNC: 618 PG/ML

## 2021-04-21 NOTE — ASSESSMENT
[FreeTextEntry1] : 41 yo M with HTN, HLD, non-obstructive CAD, and GERD who presents for FUV. \par \par 1. Chest discomfort: Non-obstructive CAD per CCTA with noted hiatal hernia\par -It is possible that the hiatal hernia may be contributing to his symptoms, and he is taking only OTC omeprazole 20mg. Recommended evaluation by GI for further recommendations. \par \par 2. HTN: Appears well controlled on amlodipine, however per wife patient's BP occasionally jumps to 140s and she gives him an extra amlodipine 10mg tab\par -No LVH on echo (09/2020) with preserved EF\par -I prescribed lisinopril 10mg PO to be given on a PRN basis when BP > 140 mmHg instead of the second dose of amlodipine. R/B/A discussed. \par \par 3. HLD: Well controlled, however in setting of ASCVD seen on CCTA, patient was started on low-dose statin, which he tolerates without adverse effects. \par \par 4. Shoulder discomfort: Patient referred to neurology by PMD\par \par FUV 6-12 months or PRN.

## 2021-04-21 NOTE — HISTORY OF PRESENT ILLNESS
[FreeTextEntry1] : 39 yo M with HTN, HLD, and GERD who presents for FUV. He underwent CCTA in setting of chest discomfort and abnormal stress test, showing <30% pLAD lesion with Calcium score 13, and incidentally noted hiatal hernia. Echocardiogram from 09/2020 showed EF 62%. \par \par Patient reports he still keeps getting occasional chest pressure similar to what he had when he had the CCTA. He takes OTC omeprazole 20mg for GERD. He alos notes left shoulder pain/numbness, which appears to vary based on arm movement/positioning. Denies LE edema, orthopnea, or PND. Patient reports compliance with all medications, denies adverse effects.\par \par

## 2021-04-23 LAB
COVID-19 SPIKE DOMAIN ANTIBODY INTERPRETATION: POSITIVE
SARS-COV-2 AB SERPL IA-ACNC: >250 U/ML

## 2021-07-29 NOTE — ED ADULT NURSE NOTE - CAS DISCH BELONGINGS RETURNED
Results for orders placed or performed in visit on 07/29/21   Cardiac EP device report    Narrative    MDT-SINGLE-PM/ NOT MRI CONDITIONAL  CARELINK TRANSMISSION: BATTERY STATUS "2 YRS "  24%  ALL AVAILABLE LEAD PARAMETERS WITHIN NORMAL LIMITS  1,566 VHRS NOTED  PT ON METO TART & EF 55% (2021)  AVAIL EGRAMS PRESENT AS RVR  NORMAL DEVICE FUNCTION  NC         Current Outpatient Medications:     acetaminophen (TYLENOL) 650 mg CR tablet, Take 1,300 mg by mouth daily, Disp: , Rfl:     Calcium Acetate, Phos Binder, (CALCIUM ACETATE PO), Take by mouth daily  , Disp: , Rfl:     clobetasol (TEMOVATE) 0 05 % ointment, Apply once weekly to the affected area, Disp: 30 g, Rfl: 3    famotidine (PEPCID) 40 MG tablet, TAKE 1 TABLET BY MOUTH EVERY DAY, Disp: 90 tablet, Rfl: 1    fluticasone (FLONASE) 50 mcg/act nasal spray, 1 spray into each nostril daily, Disp: 16 g, Rfl: 1    furosemide (LASIX) 40 mg tablet, TAKE 1 TABLET BY MOUTH EVERY DAY (Patient taking differently: 40 mg ), Disp: 90 tablet, Rfl: 3    gabapentin (NEURONTIN) 800 mg tablet, Take 1 tablet (800 mg total) by mouth 4 (four) times a day, Disp: 360 tablet, Rfl: 1    loratadine (CLARITIN) 10 mg tablet, Take 10 mg by mouth daily, Disp: , Rfl:     metoprolol tartrate (LOPRESSOR) 50 mg tablet, TAKE 1 5 TABLETS (75 MG TOTAL) BY MOUTH 2 (TWO) TIMES A DAY (Patient taking differently: Patient takes 1 tab bid), Disp: 270 tablet, Rfl: 2    multivitamin (THERAGRAN) TABS, Take 1 tablet by mouth daily, Disp: , Rfl:     pramipexole (MIRAPEX) 0 5 mg tablet, 2 FULL TABLETS TWICE A DAY AT 5:00 P  M   AND 10:00 P M , Disp: 360 tablet, Rfl: 1    sodium chloride (OCEAN) 0 65 % nasal spray, 1 spray into each nostril 2 (two) times a day as needed for rhinitis, Disp: 60 mL, Rfl: 1    warfarin (COUMADIN) 7 5 mg tablet, TAKE 1 TABLET BY MOUTH EVERY DAY, Disp: 90 tablet, Rfl: 3 Yes

## 2021-08-03 ENCOUNTER — APPOINTMENT (OUTPATIENT)
Dept: HUMAN REPRODUCTION | Facility: CLINIC | Age: 41
End: 2021-08-03

## 2021-08-11 ENCOUNTER — TRANSCRIPTION ENCOUNTER (OUTPATIENT)
Age: 41
End: 2021-08-11

## 2021-08-13 ENCOUNTER — NON-APPOINTMENT (OUTPATIENT)
Age: 41
End: 2021-08-13

## 2021-08-21 ENCOUNTER — LABORATORY RESULT (OUTPATIENT)
Age: 41
End: 2021-08-21

## 2021-08-22 LAB
25(OH)D3 SERPL-MCNC: 22.7 NG/ML
ALBUMIN SERPL ELPH-MCNC: 4.8 G/DL
ALP BLD-CCNC: 88 U/L
ALT SERPL-CCNC: 21 U/L
ANION GAP SERPL CALC-SCNC: 12 MMOL/L
APPEARANCE: CLEAR
AST SERPL-CCNC: 15 U/L
BASOPHILS # BLD AUTO: 0.06 K/UL
BASOPHILS NFR BLD AUTO: 1 %
BILIRUB SERPL-MCNC: 0.7 MG/DL
BILIRUBIN URINE: NEGATIVE
BLOOD URINE: NEGATIVE
BUN SERPL-MCNC: 10 MG/DL
CALCIUM SERPL-MCNC: 9.9 MG/DL
CHLORIDE SERPL-SCNC: 104 MMOL/L
CHOLEST SERPL-MCNC: 102 MG/DL
CO2 SERPL-SCNC: 26 MMOL/L
COLOR: YELLOW
CREAT SERPL-MCNC: 1.12 MG/DL
CREAT SPEC-SCNC: 499 MG/DL
EOSINOPHIL # BLD AUTO: 0.2 K/UL
EOSINOPHIL NFR BLD AUTO: 3.2 %
ERYTHROCYTE [SEDIMENTATION RATE] IN BLOOD BY WESTERGREN METHOD: 8 MM/HR
ESTIMATED AVERAGE GLUCOSE: 140 MG/DL
FOLATE SERPL-MCNC: 16.6 NG/ML
GGT SERPL-CCNC: 27 U/L
GLUCOSE QUALITATIVE U: NEGATIVE
GLUCOSE SERPL-MCNC: 122 MG/DL
HBA1C MFR BLD HPLC: 6.5 %
HCT VFR BLD CALC: 42.5 %
HDLC SERPL-MCNC: 34 MG/DL
HGB BLD-MCNC: 13.8 G/DL
IMM GRANULOCYTES NFR BLD AUTO: 0.2 %
IRON SATN MFR SERPL: 30 %
IRON SERPL-MCNC: 121 UG/DL
KETONES URINE: NEGATIVE
LDLC SERPL CALC-MCNC: 52 MG/DL
LEUKOCYTE ESTERASE URINE: NEGATIVE
LYMPHOCYTES # BLD AUTO: 2.12 K/UL
LYMPHOCYTES NFR BLD AUTO: 34.4 %
MAN DIFF?: NORMAL
MCHC RBC-ENTMCNC: 25.6 PG
MCHC RBC-ENTMCNC: 32.5 GM/DL
MCV RBC AUTO: 78.8 FL
MICROALBUMIN 24H UR DL<=1MG/L-MCNC: 2.4 MG/DL
MICROALBUMIN/CREAT 24H UR-RTO: 5 MG/G
MONOCYTES # BLD AUTO: 0.59 K/UL
MONOCYTES NFR BLD AUTO: 9.6 %
NEUTROPHILS # BLD AUTO: 3.19 K/UL
NEUTROPHILS NFR BLD AUTO: 51.6 %
NITRITE URINE: NEGATIVE
NONHDLC SERPL-MCNC: 68 MG/DL
PH URINE: 6.5
PLATELET # BLD AUTO: 310 K/UL
POTASSIUM SERPL-SCNC: 3.7 MMOL/L
PROT SERPL-MCNC: 7.2 G/DL
PROTEIN URINE: ABNORMAL
RBC # BLD: 5.39 M/UL
RBC # FLD: 13.3 %
SODIUM SERPL-SCNC: 143 MMOL/L
SPECIFIC GRAVITY URINE: 1.02
T3 SERPL-MCNC: 120 NG/DL
T4 FREE SERPL-MCNC: 1.3 NG/DL
TIBC SERPL-MCNC: 407 UG/DL
TRIGL SERPL-MCNC: 83 MG/DL
TSH SERPL-ACNC: 1.13 UIU/ML
UIBC SERPL-MCNC: 286 UG/DL
UROBILINOGEN URINE: ABNORMAL
VIT B12 SERPL-MCNC: 406 PG/ML
WBC # FLD AUTO: 6.17 K/UL

## 2021-08-23 ENCOUNTER — APPOINTMENT (OUTPATIENT)
Dept: INTERNAL MEDICINE | Facility: CLINIC | Age: 41
End: 2021-08-23
Payer: COMMERCIAL

## 2021-08-23 ENCOUNTER — APPOINTMENT (OUTPATIENT)
Dept: CARDIOLOGY | Facility: CLINIC | Age: 41
End: 2021-08-23
Payer: COMMERCIAL

## 2021-08-23 ENCOUNTER — APPOINTMENT (OUTPATIENT)
Dept: INTERNAL MEDICINE | Facility: CLINIC | Age: 41
End: 2021-08-23

## 2021-08-23 VITALS
HEIGHT: 66 IN | TEMPERATURE: 97.3 F | BODY MASS INDEX: 30.53 KG/M2 | WEIGHT: 190 LBS | OXYGEN SATURATION: 100 % | SYSTOLIC BLOOD PRESSURE: 120 MMHG | DIASTOLIC BLOOD PRESSURE: 84 MMHG | RESPIRATION RATE: 16 BRPM | HEART RATE: 83 BPM

## 2021-08-23 VITALS
DIASTOLIC BLOOD PRESSURE: 79 MMHG | OXYGEN SATURATION: 96 % | TEMPERATURE: 98.2 F | WEIGHT: 187 LBS | HEART RATE: 69 BPM | RESPIRATION RATE: 16 BRPM | HEIGHT: 66 IN | BODY MASS INDEX: 30.05 KG/M2 | SYSTOLIC BLOOD PRESSURE: 125 MMHG

## 2021-08-23 DIAGNOSIS — K21.9 GASTRO-ESOPHAGEAL REFLUX DISEASE W/OUT ESOPHAGITIS: ICD-10-CM

## 2021-08-23 LAB — ABO + RH PNL BLD: NORMAL

## 2021-08-23 PROCEDURE — 99213 OFFICE O/P EST LOW 20 MIN: CPT

## 2021-08-23 PROCEDURE — 99214 OFFICE O/P EST MOD 30 MIN: CPT

## 2021-08-23 NOTE — HISTORY OF PRESENT ILLNESS
[FreeTextEntry1] : 39 yo M with HTN, HLD, and GERD who presents for FUV. He underwent CCTA in setting of chest discomfort and abnormal stress test, showing <30% pLAD lesion with Calcium score 13, and incidentally noted hiatal hernia. Echocardiogram from 09/2020 showed EF 62%. \par \par Patient reports he still keeps getting occasional chest pressure similar to what he had when he had the CCTA. The pressure mainly occurs at night when he lies on his left side and is relieved with change of position. Also has rare palpitations (1-2/month) that last a few minutes at a time and do not wake up patient from sleep. No associated chest pain, dyspnea, lightheadedness, or syncope. Patient does not do regular exercise, notes an uncomfortable elevation in heart rate when walking fast or up stairs. Rhythm recording with Apple Watch during such symptoms have previously revealed a "normal rhythm" per wife. \par \par \par

## 2021-08-23 NOTE — ASSESSMENT
[FreeTextEntry1] : 41 yo M with HTN, HLD, non-obstructive CAD, and GERD who presents for FUV. \par \par 1. Chest discomfort: Non-obstructive CAD per CCTA with noted hiatal hernia\par -It is possible that the hiatal hernia may be contributing to his symptoms. Consider evaluation by GI for further recommendations. \par \par 2. HTN: Appears well controlled on amlodipine, no LVH on echo (09/2020) with preserved EF\par -I prescribed lisinopril 10mg PO to be given on a PRN basis when BP > 140 mmHg instead of the second dose of amlodipine. R/B/A discussed. \par \par 3. HLD: Well controlled, however in setting of ASCVD seen on CCTA, patient was started on low-dose statin, which he tolerates without adverse effects. \par \par 4. Borderline HbA1c:\par -Patient was counseled on lifestyle modifications including recommendations for diet and exercise\par \par FUV 6 months or PRN.

## 2021-08-23 NOTE — HEALTH RISK ASSESSMENT
[Intercurrent Urgi Care visits] : went to urgent care [No] : In the past 12 months have you used drugs other than those required for medical reasons? No [No falls in past year] : Patient reported no falls in the past year [0] : 2) Feeling down, depressed, or hopeless: Not at all (0) [] : No [de-identified] : 04/21 [de-identified] : CARD [[id number]] : 0

## 2021-08-23 NOTE — ASSESSMENT
[FreeTextEntry1] : 40 year old male found to have stable Hypertension, Hyperlipidemia, Elevated Hemoglobin A1c, GERD, Cervical Radiculopathy, with the current prescription regimen as recommended, diet and life style modifications, as counseled. Prior results reviewed, interpreted and discussed with the patient during today's examination, as appropriate. Follow up, treatment plan and tests, as ordered.\par \par Supplemental history was obtained from wife, who is accompanying the patient for today's examination.\par \par Total time spent : 25 minutes\par Including:\par Preparation prior to visit - Reviewing prior record, results of tests and Consultation Reports as applicable\par Conducting an appropriate H & P during today's encounter\par Appropriate orders for tests, medications and procedures, as applicable\par Counseling patient \par Note completion\par

## 2021-08-23 NOTE — HISTORY OF PRESENT ILLNESS
[de-identified] : 40 year old  male patient with history of stable Hypertension, Hyperlipidemia, Elevated Hemoglobin A1c, GERD, history as stated, presented for follow up examination. Patient is compliant with all medications. Denies shortness of breath, abdominal pains at this time. ROS as stated.\par

## 2021-09-17 ENCOUNTER — APPOINTMENT (OUTPATIENT)
Dept: INTERNAL MEDICINE | Facility: CLINIC | Age: 41
End: 2021-09-17
Payer: COMMERCIAL

## 2021-09-17 ENCOUNTER — NON-APPOINTMENT (OUTPATIENT)
Age: 41
End: 2021-09-17

## 2021-09-17 VITALS
RESPIRATION RATE: 16 BRPM | SYSTOLIC BLOOD PRESSURE: 129 MMHG | BODY MASS INDEX: 30.05 KG/M2 | WEIGHT: 187 LBS | DIASTOLIC BLOOD PRESSURE: 92 MMHG | HEART RATE: 65 BPM | TEMPERATURE: 97.4 F | HEIGHT: 66 IN

## 2021-09-17 DIAGNOSIS — H10.9 UNSPECIFIED CONJUNCTIVITIS: ICD-10-CM

## 2021-09-17 PROCEDURE — 99213 OFFICE O/P EST LOW 20 MIN: CPT

## 2021-09-18 PROBLEM — H10.9 CONJUNCTIVITIS: Status: RESOLVED | Noted: 2021-09-17 | Resolved: 2021-10-17

## 2021-09-18 NOTE — PHYSICAL EXAM
[No Acute Distress] : no acute distress [Well Nourished] : well nourished [Well Developed] : well developed [Well-Appearing] : well-appearing [PERRL] : pupils equal round and reactive to light [EOMI] : extraocular movements intact [Normal Outer Ear/Nose] : the outer ears and nose were normal in appearance [Normal Oropharynx] : the oropharynx was normal [No JVD] : no jugular venous distention [No Lymphadenopathy] : no lymphadenopathy [Supple] : supple [Thyroid Normal, No Nodules] : the thyroid was normal and there were no nodules present [No Respiratory Distress] : no respiratory distress  [No Accessory Muscle Use] : no accessory muscle use [Clear to Auscultation] : lungs were clear to auscultation bilaterally [Normal Rate] : normal rate  [Regular Rhythm] : with a regular rhythm [Normal S1, S2] : normal S1 and S2 [No Murmur] : no murmur heard [No Carotid Bruits] : no carotid bruits [No Abdominal Bruit] : a ~M bruit was not heard ~T in the abdomen [No Varicosities] : no varicosities [Pedal Pulses Present] : the pedal pulses are present [No Palpable Aorta] : no palpable aorta [No Edema] : there was no peripheral edema [No Extremity Clubbing/Cyanosis] : no extremity clubbing/cyanosis [Soft] : abdomen soft [Non Tender] : non-tender [Non-distended] : non-distended [No Masses] : no abdominal mass palpated [No HSM] : no HSM [Normal Bowel Sounds] : normal bowel sounds [Normal Posterior Cervical Nodes] : no posterior cervical lymphadenopathy [Normal Anterior Cervical Nodes] : no anterior cervical lymphadenopathy [No CVA Tenderness] : no CVA  tenderness [No Spinal Tenderness] : no spinal tenderness [No Joint Swelling] : no joint swelling [Grossly Normal Strength/Tone] : grossly normal strength/tone [No Rash] : no rash [Coordination Grossly Intact] : coordination grossly intact [No Focal Deficits] : no focal deficits [Normal Gait] : normal gait [Normal Affect] : the affect was normal [Deep Tendon Reflexes (DTR)] : deep tendon reflexes were 2+ and symmetric [Normal Insight/Judgement] : insight and judgment were intact [de-identified] : redness conjunctive, no periorbital swelling, no drainage

## 2021-09-18 NOTE — HISTORY OF PRESENT ILLNESS
[FreeTextEntry8] : 40yoM PMH HTN, HLD presents with right eye pain, redness and tearing\par \par moved a table fan and experienced irritation right eye\par used paper towel to wipe inside right eye\par awoke this morning with eye redness and eye pain\par denies vision change

## 2021-11-22 ENCOUNTER — NON-APPOINTMENT (OUTPATIENT)
Age: 41
End: 2021-11-22

## 2021-11-24 ENCOUNTER — RX RENEWAL (OUTPATIENT)
Age: 41
End: 2021-11-24

## 2022-01-13 LAB
25(OH)D3 SERPL-MCNC: 20.3 NG/ML
ALBUMIN SERPL ELPH-MCNC: 4.7 G/DL
ALP BLD-CCNC: 90 U/L
ALT SERPL-CCNC: 31 U/L
ANION GAP SERPL CALC-SCNC: 17 MMOL/L
AST SERPL-CCNC: 24 U/L
BASOPHILS # BLD AUTO: 0.06 K/UL
BASOPHILS NFR BLD AUTO: 0.9 %
BILIRUB SERPL-MCNC: 0.6 MG/DL
BUN SERPL-MCNC: 9 MG/DL
CALCIUM SERPL-MCNC: 10 MG/DL
CHLORIDE SERPL-SCNC: 102 MMOL/L
CHOLEST SERPL-MCNC: 134 MG/DL
CO2 SERPL-SCNC: 22 MMOL/L
CREAT SERPL-MCNC: 0.96 MG/DL
EOSINOPHIL # BLD AUTO: 0.23 K/UL
EOSINOPHIL NFR BLD AUTO: 3.4 %
ESTIMATED AVERAGE GLUCOSE: 143 MG/DL
GGT SERPL-CCNC: 31 U/L
GLUCOSE SERPL-MCNC: 132 MG/DL
HBA1C MFR BLD HPLC: 6.6 %
HCT VFR BLD CALC: 44.7 %
HDLC SERPL-MCNC: 41 MG/DL
HGB BLD-MCNC: 14.4 G/DL
IMM GRANULOCYTES NFR BLD AUTO: 0.1 %
LDLC SERPL CALC-MCNC: 70 MG/DL
LYMPHOCYTES # BLD AUTO: 2.07 K/UL
LYMPHOCYTES NFR BLD AUTO: 30.6 %
MAN DIFF?: NORMAL
MCHC RBC-ENTMCNC: 25.5 PG
MCHC RBC-ENTMCNC: 32.2 GM/DL
MCV RBC AUTO: 79.1 FL
MONOCYTES # BLD AUTO: 0.69 K/UL
MONOCYTES NFR BLD AUTO: 10.2 %
NEUTROPHILS # BLD AUTO: 3.7 K/UL
NEUTROPHILS NFR BLD AUTO: 54.8 %
NONHDLC SERPL-MCNC: 93 MG/DL
PLATELET # BLD AUTO: 323 K/UL
POTASSIUM SERPL-SCNC: 3.8 MMOL/L
PROT SERPL-MCNC: 7.3 G/DL
RBC # BLD: 5.65 M/UL
RBC # FLD: 13.3 %
SODIUM SERPL-SCNC: 141 MMOL/L
TRIGL SERPL-MCNC: 118 MG/DL
WBC # FLD AUTO: 6.76 K/UL

## 2022-01-25 ENCOUNTER — APPOINTMENT (OUTPATIENT)
Dept: INTERNAL MEDICINE | Facility: CLINIC | Age: 42
End: 2022-01-25
Payer: SELF-PAY

## 2022-01-25 VITALS
DIASTOLIC BLOOD PRESSURE: 89 MMHG | RESPIRATION RATE: 16 BRPM | WEIGHT: 193 LBS | TEMPERATURE: 98.2 F | BODY MASS INDEX: 31.02 KG/M2 | SYSTOLIC BLOOD PRESSURE: 130 MMHG | HEIGHT: 66 IN | HEART RATE: 86 BPM | OXYGEN SATURATION: 100 %

## 2022-01-25 DIAGNOSIS — E55.9 VITAMIN D DEFICIENCY, UNSPECIFIED: ICD-10-CM

## 2022-01-25 PROCEDURE — 99213 OFFICE O/P EST LOW 20 MIN: CPT

## 2022-01-25 RX ORDER — POLYMYXIN B SULFATE AND TRIMETHOPRIM 10000; 1 [USP'U]/ML; MG/ML
10000-0.1 SOLUTION OPHTHALMIC
Qty: 1 | Refills: 0 | Status: DISCONTINUED | COMMUNITY
Start: 2021-09-17 | End: 2022-01-25

## 2022-01-25 NOTE — ASSESSMENT
[FreeTextEntry1] : 41 year old male found to have stable Hypertension, Hyperlipidemia, Elevated Hemoglobin A1c, GERD, Cervical Radiculopathy, Vitamin D Deficiency, with the current prescription regimen as recommended, diet and life style modifications, as counseled. Prior results reviewed, interpreted and discussed with the patient during today's examination, as appropriate. Follow up, treatment plan and tests, as ordered.\par \par Total time spent : 25 minutes\par Including:\par Preparation prior to visit - Reviewing prior record, results of tests and Consultation Reports as applicable\par Conducting an appropriate H & P during today's encounter\par Appropriate orders for tests, medications and procedures, as applicable\par Counseling patient \par Note completion\par

## 2022-01-25 NOTE — HEALTH RISK ASSESSMENT
[Intercurrent Urgi Care visits] : went to urgent care [Never] : Never [No] : In the past 12 months have you used drugs other than those required for medical reasons? No [No falls in past year] : Patient reported no falls in the past year [0] : 2) Feeling down, depressed, or hopeless: Not at all (0) [de-identified] : 04/21 [de-identified] : None [[id number]] : 0

## 2022-01-25 NOTE — HISTORY OF PRESENT ILLNESS
[de-identified] : 41 year old  male patient with history of stable Hypertension, Hyperlipidemia, Elevated Hemoglobin A1c, GERD, Cervical Radiculopathy, history as stated, presented for follow up examination. Patient is non-compliant with Lisinopril, as counseled. Denies shortness of breath, chest pain or abdominal pains at this time. ROS as stated.\par

## 2022-09-13 ENCOUNTER — NON-APPOINTMENT (OUTPATIENT)
Age: 42
End: 2022-09-13

## 2022-09-13 ENCOUNTER — APPOINTMENT (OUTPATIENT)
Dept: CARDIOLOGY | Facility: CLINIC | Age: 42
End: 2022-09-13

## 2022-09-13 VITALS
TEMPERATURE: 97.2 F | BODY MASS INDEX: 31.34 KG/M2 | OXYGEN SATURATION: 98 % | HEART RATE: 93 BPM | SYSTOLIC BLOOD PRESSURE: 134 MMHG | DIASTOLIC BLOOD PRESSURE: 93 MMHG | HEIGHT: 66 IN | WEIGHT: 195 LBS

## 2022-09-13 PROCEDURE — 93000 ELECTROCARDIOGRAM COMPLETE: CPT

## 2022-09-13 PROCEDURE — 99214 OFFICE O/P EST MOD 30 MIN: CPT

## 2022-09-15 NOTE — ASSESSMENT
[FreeTextEntry1] : 41 yo M with HTN, HLD, non-obstructive CAD, and GERD who presents for FUV. \par \par 1. Non-obstructive CAD:\par –No anginal symptoms\par – Continue with atorvastatin 10 mg p.o. nightly.  Last LDL at target at 70\par - Patient was counseled on lifestyle modifications including recommendations for diet and exercise\par \par 2. HTN: Appears well controlled on amlodipine 10mg and lisinopril 10mg , no LVH on echo (09/2020) with preserved EF\par –There is room to increase lisinopril in the future if needed\par \par 3. HLD: Well controlled, however in setting of ASCVD seen on CCTA, patient was started on low-dose statin, which he tolerates without adverse effects. \par \par 4. Borderline HbA1c:\par –Discussed with patient that his value from January of 6.6% was compatible with the diagnosis of diabetes.  He is pending follow-up with PMD to recheck the A1c levels.\par -Patient was counseled on lifestyle modifications including recommendations for diet and exercise, including 7-minute work out elroy\par \par FUV 6-12 months or PRN.

## 2022-09-15 NOTE — HISTORY OF PRESENT ILLNESS
[FreeTextEntry1] : 42 yo M with HTN, HLD, and GERD who presents for FUV. He underwent CCTA in setting of chest discomfort and abnormal stress test, showing <30% pLAD lesion with Calcium score 13, and incidentally noted hiatal hernia. Echocardiogram from 09/2020 showed EF 62%. \par \par Patient reports that he feels well overall and denies any chest pain, dyspnea, palpitations, lightheadedness, or syncope. Denies LE edema, orthopnea, or PND. Patient reports compliance with all medications, denies adverse effects. He did run out of the medications a few days ago though. Does not exercise regularly. \par \par \par \par \par

## 2022-09-16 ENCOUNTER — RX RENEWAL (OUTPATIENT)
Age: 42
End: 2022-09-16

## 2022-09-17 ENCOUNTER — APPOINTMENT (OUTPATIENT)
Dept: INTERNAL MEDICINE | Facility: CLINIC | Age: 42
End: 2022-09-17

## 2022-09-17 VITALS
OXYGEN SATURATION: 98 % | DIASTOLIC BLOOD PRESSURE: 82 MMHG | TEMPERATURE: 97.3 F | WEIGHT: 195 LBS | BODY MASS INDEX: 31.34 KG/M2 | HEIGHT: 66 IN | RESPIRATION RATE: 16 BRPM | HEART RATE: 74 BPM | SYSTOLIC BLOOD PRESSURE: 118 MMHG

## 2022-09-17 DIAGNOSIS — Z00.00 ENCOUNTER FOR GENERAL ADULT MEDICAL EXAMINATION W/OUT ABNORMAL FINDINGS: ICD-10-CM

## 2022-09-17 PROCEDURE — 99396 PREV VISIT EST AGE 40-64: CPT

## 2022-09-17 NOTE — HEALTH RISK ASSESSMENT
[Good] : ~his/her~  mood as  good [No] : In the past 12 months have you used drugs other than those required for medical reasons? No [No falls in past year] : Patient reported no falls in the past year [0] : 2) Feeling down, depressed, or hopeless: Not at all (0) [FreeTextEntry1] : Check up\par  [de-identified] : CARD [[id number]] : 0

## 2022-09-17 NOTE — ASSESSMENT
[FreeTextEntry1] : 41 year old male found to have stable Hypertension, Hyperlipidemia, Elevated Hemoglobin A1c, GERD,with the current prescription regimen as recommended, diet and life style modifications, as counseled. Prior results reviewed, interpreted and discussed with the patient during today's examination, as appropriate. Follow up, treatment plan and tests, as ordered.\par

## 2022-09-17 NOTE — HEALTH RISK ASSESSMENT
[Good] : ~his/her~  mood as  good [No] : In the past 12 months have you used drugs other than those required for medical reasons? No [No falls in past year] : Patient reported no falls in the past year [0] : 2) Feeling down, depressed, or hopeless: Not at all (0) [FreeTextEntry1] : Check up\par  [de-identified] : CARD [[id number]] : 0

## 2022-09-17 NOTE — HISTORY OF PRESENT ILLNESS
[de-identified] : 41 year old male patient with history of stable Hypertension, Hyperlipidemia, Elevated Hemoglobin A1c, GERD, history as stated, presented for an annual preventative examination.\par Patient denies any associated symptoms of shortness of breath, chest pain, abdominal pain at this time.\par

## 2022-09-17 NOTE — HISTORY OF PRESENT ILLNESS
[de-identified] : 41 year old male patient with history of stable Hypertension, Hyperlipidemia, Elevated Hemoglobin A1c, GERD, history as stated, presented for an annual preventative examination.\par Patient denies any associated symptoms of shortness of breath, chest pain, abdominal pain at this time.\par

## 2022-09-18 DIAGNOSIS — E11.9 TYPE 2 DIABETES MELLITUS W/OUT COMPLICATIONS: ICD-10-CM

## 2022-09-18 LAB
25(OH)D3 SERPL-MCNC: 16.3 NG/ML
ALBUMIN SERPL ELPH-MCNC: 4.5 G/DL
ALP BLD-CCNC: 79 U/L
ALT SERPL-CCNC: 27 U/L
ANION GAP SERPL CALC-SCNC: 12 MMOL/L
APPEARANCE: CLEAR
AST SERPL-CCNC: 17 U/L
BACTERIA: NEGATIVE
BASOPHILS # BLD AUTO: 0.06 K/UL
BASOPHILS NFR BLD AUTO: 1 %
BILIRUB SERPL-MCNC: 0.5 MG/DL
BILIRUBIN URINE: NEGATIVE
BLOOD URINE: NEGATIVE
BUN SERPL-MCNC: 11 MG/DL
CALCIUM SERPL-MCNC: 9.6 MG/DL
CHLORIDE SERPL-SCNC: 103 MMOL/L
CHOLEST SERPL-MCNC: 155 MG/DL
CO2 SERPL-SCNC: 26 MMOL/L
COLOR: NORMAL
CREAT SERPL-MCNC: 0.92 MG/DL
CREAT SPEC-SCNC: 69 MG/DL
EGFR: 106 ML/MIN/1.73M2
EOSINOPHIL # BLD AUTO: 0.21 K/UL
EOSINOPHIL NFR BLD AUTO: 3.6 %
ESTIMATED AVERAGE GLUCOSE: 166 MG/DL
GGT SERPL-CCNC: 33 U/L
GLUCOSE QUALITATIVE U: NEGATIVE
GLUCOSE SERPL-MCNC: 123 MG/DL
HBA1C MFR BLD HPLC: 7.4 %
HCT VFR BLD CALC: 42.7 %
HDLC SERPL-MCNC: 38 MG/DL
HGB BLD-MCNC: 13.8 G/DL
HYALINE CASTS: 0 /LPF
IMM GRANULOCYTES NFR BLD AUTO: 0.2 %
KETONES URINE: NEGATIVE
LDLC SERPL CALC-MCNC: 97 MG/DL
LEUKOCYTE ESTERASE URINE: NEGATIVE
LYMPHOCYTES # BLD AUTO: 2.16 K/UL
LYMPHOCYTES NFR BLD AUTO: 36.7 %
MAN DIFF?: NORMAL
MCHC RBC-ENTMCNC: 25.6 PG
MCHC RBC-ENTMCNC: 32.3 GM/DL
MCV RBC AUTO: 79.1 FL
MICROALBUMIN 24H UR DL<=1MG/L-MCNC: <1.2 MG/DL
MICROALBUMIN/CREAT 24H UR-RTO: NORMAL MG/G
MICROSCOPIC-UA: NORMAL
MONOCYTES # BLD AUTO: 0.62 K/UL
MONOCYTES NFR BLD AUTO: 10.5 %
NEUTROPHILS # BLD AUTO: 2.82 K/UL
NEUTROPHILS NFR BLD AUTO: 48 %
NITRITE URINE: NEGATIVE
NONHDLC SERPL-MCNC: 117 MG/DL
PH URINE: 6.5
PLATELET # BLD AUTO: 314 K/UL
POTASSIUM SERPL-SCNC: 3.5 MMOL/L
PROT SERPL-MCNC: 7.1 G/DL
PROTEIN URINE: NEGATIVE
RBC # BLD: 5.4 M/UL
RBC # FLD: 13.4 %
RED BLOOD CELLS URINE: 0 /HPF
SODIUM SERPL-SCNC: 141 MMOL/L
SPECIFIC GRAVITY URINE: 1.01
SQUAMOUS EPITHELIAL CELLS: 0 /HPF
TRIGL SERPL-MCNC: 100 MG/DL
TSH SERPL-ACNC: 1.03 UIU/ML
UROBILINOGEN URINE: NORMAL
WBC # FLD AUTO: 5.88 K/UL
WHITE BLOOD CELLS URINE: 0 /HPF

## 2022-09-18 RX ORDER — METFORMIN HYDROCHLORIDE 500 MG/1
500 TABLET, COATED ORAL TWICE DAILY
Qty: 180 | Refills: 3 | Status: ACTIVE | COMMUNITY
Start: 2022-09-18 | End: 1900-01-01

## 2022-09-19 ENCOUNTER — NON-APPOINTMENT (OUTPATIENT)
Age: 42
End: 2022-09-19

## 2022-09-20 RX ORDER — BLOOD-GLUCOSE METER
W/DEVICE KIT MISCELLANEOUS
Qty: 1 | Refills: 0 | Status: ACTIVE | COMMUNITY
Start: 2022-09-20 | End: 1900-01-01

## 2022-09-20 RX ORDER — BLOOD-GLUCOSE METER
70 EACH MISCELLANEOUS
Qty: 3 | Refills: 3 | Status: ACTIVE | COMMUNITY
Start: 2022-09-20 | End: 1900-01-01

## 2022-09-20 RX ORDER — LANCETS 33 GAUGE
EACH MISCELLANEOUS
Qty: 270 | Refills: 3 | Status: ACTIVE | COMMUNITY
Start: 2022-09-20 | End: 1900-01-01

## 2022-09-27 RX ORDER — BLOOD-GLUCOSE METER
KIT MISCELLANEOUS 3 TIMES DAILY
Qty: 270 | Refills: 3 | Status: ACTIVE | COMMUNITY
Start: 2022-09-20 | End: 1900-01-01

## 2022-10-13 ENCOUNTER — APPOINTMENT (OUTPATIENT)
Dept: ENDOCRINOLOGY | Facility: CLINIC | Age: 42
End: 2022-10-13

## 2022-10-13 VITALS
WEIGHT: 198 LBS | RESPIRATION RATE: 16 BRPM | HEIGHT: 66 IN | DIASTOLIC BLOOD PRESSURE: 83 MMHG | TEMPERATURE: 98.2 F | OXYGEN SATURATION: 97 % | HEART RATE: 98 BPM | SYSTOLIC BLOOD PRESSURE: 123 MMHG | BODY MASS INDEX: 31.82 KG/M2

## 2022-10-13 PROCEDURE — 99203 OFFICE O/P NEW LOW 30 MIN: CPT | Mod: 25

## 2022-10-13 PROCEDURE — G0108 DIAB MANAGE TRN  PER INDIV: CPT

## 2022-10-14 LAB — GLUCOSE BLDC GLUCOMTR-MCNC: 224

## 2022-11-11 DIAGNOSIS — L40.9 PSORIASIS, UNSPECIFIED: ICD-10-CM

## 2022-11-11 RX ORDER — FLUOCINONIDE 0.5 MG/G
0.05 CREAM TOPICAL 4 TIMES DAILY
Qty: 1 | Refills: 3 | Status: ACTIVE | COMMUNITY
Start: 2022-11-11 | End: 1900-01-01

## 2022-12-27 ENCOUNTER — APPOINTMENT (OUTPATIENT)
Dept: DERMATOLOGY | Facility: CLINIC | Age: 42
End: 2022-12-27

## 2023-01-01 NOTE — ED PROVIDER NOTE - PHYSICAL EXAMINATION
Physical Therapy    Visit Type: treatment  Born at Gestational Age: 39w5d and now corrected age 49w 1d    Nursing comments: Okay to see for PT.  RN report mom has been working on increase turning head to left and it is improved    SUBJECTIVE  No family present for session  Patient / Family Goals: home exercise education    Pain   RN informed on pain level  Face, Legs, Activity, Cry, Consolability Scale (FLACC)     Face: 0 - No particular expression or smile    Legs: 0 - Normal position or relaxed    Activity: 0 - Lying quietly, normal position, moves easily    Cry: 0 - No cry (awake or asleep)    Consolability: 0 - Content, relaxed    Score: 0    OBJECTIVE    Autonomic Stability:    Heart Rate: stable    Respiratory: stable    Oxygen Saturations: stable    Comments/Details:         Bed Type: open crib  Respiratory Support: trach and ventilator       Current Positioning Device: Gel pillow, under head, Frog, Elevated head of bed and Swaddle (cozy cub, mama uri)  Feeding/Nutritional Status: NG    State: quiet, alert and awake, eyes open, minimal movement    Neurobehavioral:    Cry: no cry at all    Tolerance to sensory input: adapts    Touch response: adapts with containment/therapeutic touch    Vestibular processing: adapts with containment/support    Infant stress cues:      -physiological: hiccoughs      -behavioral: grimace    Irritability: no cry at all to stimuli    Consolability: consoled by sustained/containment touch and consoled by swaddling    Self regulation:       -achieved with assistance of: therapeutic pauses, containment and swaddle    Postural Assessment:   Head posture:     - in supine: head rotated left (trach positioned to left and infant with head turned to left more now)  Resting trunk posture: trunk midline and symmetrical  Rib cage: pectus  UE position:    - Left: shoulder protracted, elbow flexed, shoulder level and open hand    - Right: shoulder protracted, open hand, elbow flexed and  shoulder level  LE position:    - Left: hip full flexion, knee flexed and ankle dorsiflexion   - Right: hip full flexion, knee flexed and ankle dorsiflexion    Torticollis Assessment:   Cervical ROM (in degrees unless otherwise noted):   In supine:     - Rotation:       • Left: WNL passive ROM      • Right: WNL passive ROM    - Sidebend/lateral flexion:      • Left: WNL passive ROM      • Right: WNL passive ROM    Head Control:   Supine: resting to left and resting to right (able to maintain in midline with support)    Muscle Tone:  Low muscle tone:    - Trunk: mild  Range of Motion (noted in % unless otherwise indicated, active reported unless noted):  WNL: LLE, RLE  Comments / Details: Physiological flexion  Hip abduction symmetrical    Strength: (scale: WNL, WFL, weak, good, fair, poor, trace, absent)    Myotome: (out of 5, innervation in ( ), *=inconsistent innervation)  Hip Iliopsoas (L1-L4): • Left: present • Right: present  Hip Adductor Group (L2-L4): • Left: present • Right: present  Knee Quadriceps (L2-L4): • Left: present • Right: present  Hip Gluteus Medius (L5, S1): • Left: present • Right: present  Foot Tibialis Anterior (L4, L5, S1): • Left: present • Right: present  Ankle Soleus (L5, S1, S2): • Left: present • Right: present  Ankle Gastrocnemius (S1, S2): • Left: present • Right: present  Knee Biceps Femoris (L5, S1-S3): • Left: present • Right: present  Knee Inner Hamstrings (L4*, L5*, S1, S2): • Left: present • Right: present  Comments / Details: Glut max not assessed due to prone not performed. Unable to formally assess in side lying.          Interventions  Exercises   Exercise details: Gentle upper and lower extremity ROM  Gentle neck ROM      Positional:  -Supine: hands to midline, midline play in sidelying, positioning and superior flexion with pelvic tilt (tolerates right side>left)  -Sitting/Transition: supported sit (tolerated supported upright holding head upright  briefly)    Sensory:  -proprioceptive input and calming measures     Treatment Provided:  -activity tolerance and positioning     Skilled input: As detailed above    Education Completed  - Person instructed: RN  - Teaching method: written material and verbal instruction/explanation  - Response to education: Verbalizes understanding  -   Yellow therapeutic positioning sheet posted at bedside.      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 Concern for VACTERL association. S/p tracheostomy 10/26, first trach change 11/2.     Infant awake and alert and smiling. Able to focus on face and starting to track. Demonstrates good antigravity lower extremity kicking and bringing hands to midline/mouth. Tolerated 4-5 minutes in supported upright position before fatigue, holding head upright briefly. Demonstrated tolerated right side lying>left. Observed to turn head more to left today and mouthing left hand more. Did not assess glut max due to unable to perform prone at this time. All other muscle groups/myotomes in hip/knee/ankle/feet observed.  Will benefit from ongoing PT for positioning and activity tolerance to position changes.      Discharge Recommendations  PT Referrals/Discharge Recommendations: Refer to NICU follow up clinic, Refer to early intervention  Skilled therapy Patient will benefit from skilled therapy to address listed impairments and functional limitations.  - Progress: progressing toward goals  Pain at end of session: RN informed on pain level  Face, Legs, Activity, Cry, Consolability (FLACC) at end of session:    Face: 0-No particular expression or smile     Legs: 0-Normal position or relaxed    Activity: 0 - Lying quietly,  normal position, moves easily    Cry: 0-No cry (awake or asleep)    Consolability: 0-Content, relaxed    Score: 0       Interpretation: 0=relaxed and comfortable, 1-3=mild discomfort, 4-6=moderate pain, 7-10=severe       discomfort patient or pain or both    End of Session:  Location: rails up and open crib  Safety measures: lines intact  Handoff to: nurse  PLAN    Suggestions for next session as indicated: Frequency of Treatment: 1-2x week  1 of 2 sessions  Interventions: positioning, patient/family training, facilitation of transitions and ROM  Agreement to plan and goals: Parent/caregiver agrees with goals and treatment plan      GOALS    Short Term Goals:   Prone to be assessed when medically appropriate.   Long Term Goals: (to be met by time of discharge from hospital)  Hand to midline: Patient will consistently bring hands independently to midline in supine Status: progressing/ongoing  Supported sitting: Patient will tolerate supported sitting for 5 minutes with stable vital signs  Caregiver HEP: Caregiver/family will be independent with positioning, ROM and ability to recognize and encourage infant's developmental behaviors        Documented in the chart in the following areas: Assessment/Plan.      Therapy procedure time and total treatment time can be found documented on the Time Entry flowsheet   Malia Seals, DO:   Gen: Uncomfortable appearing but non-toxic, NAD  Head: NCAT  HEENT: PERRL, MMM, normal conjunctiva, anicteric, neck supple  Lung: CTAB, no adventitious sounds  CV: RRR, no murmurs, rubs or gallops  Abd: soft, TTP in RLQ and RUQ with +Seaman's sign, NTND, no rebound or guarding, no CVAT  : testicles symmetrical within normal lie with no TTP (Chaperone: Dr. Cordova)  MSK: No edema, no visible deformities  Neuro: No focal neurologic deficits. CN II-XII grossly intact. 5/5 strength and normal sensation in all extremities.  Skin: Warm and dry, no evidence of rash  Psych: normal mood and affect

## 2023-01-19 ENCOUNTER — APPOINTMENT (OUTPATIENT)
Dept: DERMATOLOGY | Facility: CLINIC | Age: 43
End: 2023-01-19

## 2023-01-24 ENCOUNTER — APPOINTMENT (OUTPATIENT)
Dept: DERMATOLOGY | Facility: CLINIC | Age: 43
End: 2023-01-24
Payer: COMMERCIAL

## 2023-01-24 PROCEDURE — 96372 THER/PROPH/DIAG INJ SC/IM: CPT

## 2023-01-24 PROCEDURE — 99204 OFFICE O/P NEW MOD 45 MIN: CPT | Mod: 25

## 2023-02-02 ENCOUNTER — APPOINTMENT (OUTPATIENT)
Dept: DERMATOLOGY | Facility: CLINIC | Age: 43
End: 2023-02-02

## 2023-03-14 ENCOUNTER — APPOINTMENT (OUTPATIENT)
Dept: CARDIOLOGY | Facility: CLINIC | Age: 43
End: 2023-03-14

## 2023-03-28 ENCOUNTER — APPOINTMENT (OUTPATIENT)
Dept: DERMATOLOGY | Facility: CLINIC | Age: 43
End: 2023-03-28

## 2023-05-09 ENCOUNTER — APPOINTMENT (OUTPATIENT)
Dept: DERMATOLOGY | Facility: CLINIC | Age: 43
End: 2023-05-09
Payer: COMMERCIAL

## 2023-05-09 PROCEDURE — 99214 OFFICE O/P EST MOD 30 MIN: CPT

## 2023-05-30 ENCOUNTER — RX RENEWAL (OUTPATIENT)
Age: 43
End: 2023-05-30

## 2023-08-15 ENCOUNTER — APPOINTMENT (OUTPATIENT)
Dept: DERMATOLOGY | Facility: CLINIC | Age: 43
End: 2023-08-15

## 2023-08-28 ENCOUNTER — NON-APPOINTMENT (OUTPATIENT)
Age: 43
End: 2023-08-28

## 2023-08-28 ENCOUNTER — APPOINTMENT (OUTPATIENT)
Dept: CARDIOLOGY | Facility: CLINIC | Age: 43
End: 2023-08-28
Payer: COMMERCIAL

## 2023-08-28 VITALS
SYSTOLIC BLOOD PRESSURE: 121 MMHG | DIASTOLIC BLOOD PRESSURE: 78 MMHG | TEMPERATURE: 98.9 F | WEIGHT: 184 LBS | OXYGEN SATURATION: 95 % | HEIGHT: 66 IN | HEART RATE: 91 BPM | BODY MASS INDEX: 29.57 KG/M2

## 2023-08-28 DIAGNOSIS — R20.2 PARESTHESIA OF SKIN: ICD-10-CM

## 2023-08-28 PROCEDURE — 93000 ELECTROCARDIOGRAM COMPLETE: CPT

## 2023-08-28 PROCEDURE — 99214 OFFICE O/P EST MOD 30 MIN: CPT

## 2023-08-29 NOTE — PHYSICAL EXAM
[de-identified] : General: No acute distress HEENT: EOMI  Neck: Supple, No JVD, no bruits Lungs: Clear to auscultation bilaterally; No rales or wheezing Heart: Regular rate and rhythm; No murmurs, rubs, or gallops Abdomen: Nontender, bowel sounds present Extremities: No clubbing, cyanosis, or edema Nervous system:  Alert & Oriented X3, no focal deficits Psychiatric: Normal affect Skin: No rashes or lesions

## 2023-08-29 NOTE — ASSESSMENT
[FreeTextEntry1] : 41 yo M with HTN, HLD, DM, non-obstructive CAD, and GERD who presents for FUV.   1. Chest discomfort: Patient's current symptoms, particularly with paresthesias radiating down to the fingertips, are more suggestive of a nerve impingement syndrome than cardiac etiology. - Patient was therefore referred to neurology for further evaluation - If the neurology evaluation comes back noncontributory and patient continues to have chest discomfort, may be reasonable to repeat coronary CT angiogram to see whether there has been progression of CAD over the past 3 years, though as mentioned clinical suspicion remains lower at this time.  Patient's EKG is likewise unremarkable.  2. HTN: Appears well controlled on amlodipine, no LVH on echo (09/2020) with preserved EF -I prescribed lisinopril 10mg PO to be given on a PRN basis when BP > 140 mmHg instead of the second dose of amlodipine. R/B/A discussed.   3. HLD: Well controlled, however in setting of ASCVD seen on CCTA, patient was started on low-dose statin, which he tolerates without adverse effects.  ****Repeat blood work ordered; LDL remains at goal  FUV 9 months or PRN.   Will call patient with results of testing at [phone], OK to leave voicemail.

## 2023-08-29 NOTE — HISTORY OF PRESENT ILLNESS
[FreeTextEntry1] : 43 yo M with HTN, HLD, DM, and GERD who presents for FUV. He underwent CCTA (2020) in setting of chest discomfort and abnormal stress test, showing <30% pLAD lesion with Calcium score 13, and incidentally noted hiatal hernia. Echocardiogram from 09/2020 showed EF 62%.   Patient reports that for the past 2 months he has been experiencing left-sided chest pain that radiates to the left shoulder and arm.  This is associated with numbness and tingling, and goes down to the fingertips.  Symptoms last for 5 to 10 minutes at a time and are not associated with dyspnea, palpitations, lightheadedness, or syncope.  No relationship between symptoms and activity.  Patient also reports feeling weakness on the left arm compared to the right. Denies LE edema, orthopnea, or PND.

## 2023-08-29 NOTE — REVIEW OF SYSTEMS
[FreeTextEntry2] : Constitutional, Visual, Respiratory, Cardiovascular, Gastrointestinal, Genitourinary, Neurologic, Integumentary, Endocrine, Musculoskeletal, Psychiatric, and Hematologic systems reviewed and are all negative except as in HPI.	 OR: [ ] Unable to obtain

## 2023-08-30 LAB
ALBUMIN SERPL ELPH-MCNC: 4.9 G/DL
ALP BLD-CCNC: 77 U/L
ALT SERPL-CCNC: 29 U/L
ANION GAP SERPL CALC-SCNC: 12 MMOL/L
AST SERPL-CCNC: 22 U/L
BILIRUB SERPL-MCNC: 0.4 MG/DL
BUN SERPL-MCNC: 12 MG/DL
CALCIUM SERPL-MCNC: 10 MG/DL
CHLORIDE SERPL-SCNC: 102 MMOL/L
CHOLEST SERPL-MCNC: 97 MG/DL
CO2 SERPL-SCNC: 26 MMOL/L
CREAT SERPL-MCNC: 1.01 MG/DL
EGFR: 94 ML/MIN/1.73M2
ESTIMATED AVERAGE GLUCOSE: 146 MG/DL
GLUCOSE SERPL-MCNC: 109 MG/DL
HBA1C MFR BLD HPLC: 6.7 %
HDLC SERPL-MCNC: 35 MG/DL
LDLC SERPL CALC-MCNC: 46 MG/DL
NONHDLC SERPL-MCNC: 62 MG/DL
POTASSIUM SERPL-SCNC: 4.3 MMOL/L
PROT SERPL-MCNC: 7.3 G/DL
SODIUM SERPL-SCNC: 139 MMOL/L
TRIGL SERPL-MCNC: 79 MG/DL
TSH SERPL-ACNC: 1.04 UIU/ML

## 2023-10-07 ENCOUNTER — RX RENEWAL (OUTPATIENT)
Age: 43
End: 2023-10-07

## 2023-11-24 ENCOUNTER — NON-APPOINTMENT (OUTPATIENT)
Age: 43
End: 2023-11-24

## 2024-02-09 ENCOUNTER — APPOINTMENT (OUTPATIENT)
Dept: DERMATOLOGY | Facility: CLINIC | Age: 44
End: 2024-02-09
Payer: COMMERCIAL

## 2024-02-09 DIAGNOSIS — L30.9 DERMATITIS, UNSPECIFIED: ICD-10-CM

## 2024-02-09 DIAGNOSIS — L85.3 XEROSIS CUTIS: ICD-10-CM

## 2024-02-09 PROCEDURE — 99213 OFFICE O/P EST LOW 20 MIN: CPT

## 2024-02-09 RX ORDER — BETAMETHASONE DIPROPIONATE 0.5 MG/G
0.05 OINTMENT, AUGMENTED TOPICAL
Qty: 1 | Refills: 2 | Status: ACTIVE | COMMUNITY
Start: 2023-01-24 | End: 1900-01-01

## 2024-02-22 NOTE — HISTORY OF PRESENT ILLNESS
[FreeTextEntry1] : return pt: rash [de-identified] : Referred by: Dr. MUELLER,REFERRED   Mr. ZHAO DOOLEY is a 43 year old M hx DM2 here for evaluation of 1) Rash on lower legs, arms, back - itchy Also reports pruritus over the arms. Does not moisturize often. Not using TS often. 2) skin tags on the neck and axillae

## 2024-02-22 NOTE — PHYSICAL EXAM
[Alert] : alert [Oriented x 3] : ~L oriented x 3 [Well Nourished] : well nourished [Conjunctiva Non-injected] : conjunctiva non-injected [No Visual Lymphadenopathy] : no visual  lymphadenopathy [No Clubbing] : no clubbing [No Edema] : no edema [No Bromhidrosis] : no bromhidrosis [No Chromhidrosis] : no chromhidrosis [FreeTextEntry3] : pink eczematous round papules over b/l lower legs, arms, back diffuse xerosis negative dermatographism

## 2024-02-22 NOTE — HISTORY OF PRESENT ILLNESS
[FreeTextEntry1] : return pt: rash [de-identified] : Referred by: Dr. MUELLER,REFERRED   Mr. ZHAO DOOLEY is a 43 year old M hx DM2 here for evaluation of 1) Rash on lower legs, arms, back - itchy Also reports pruritus over the arms. Does not moisturize often. Not using TS often. 2) skin tags on the neck and axillae

## 2024-02-22 NOTE — ASSESSMENT
[FreeTextEntry1] : 1. Eczematous dermatitis, chronic, flaring ISO xerosis - Discussed nature, chronicity, and unpredictable course - Rec aug betamethasone oint to AA bid 2x/day as needed for up to 2 weeks at a time, SED - Dry skin care reviewed: shower no more than once daily for 5-10 minutes with lukewarm water - Rec zyrtec daily  2. Xerosis Discussed nature, chronicity and unpredictable course - Discussed gentle skin care practices, including avoiding hot water, using fragrance free products, and frequent liberal moisturization with cream or ointment based emollient  3. Acrochordons about 7-10 tags - Diagnosis reviewed. I have discussed the benign nature and usual course with the patient. Patient reassured. - $200 fee quoted to remove, can make another visit   RTC for skin tag removal (cosemtic) or f/u in 1-2 mo

## 2024-03-19 ENCOUNTER — APPOINTMENT (OUTPATIENT)
Dept: CARDIOLOGY | Facility: CLINIC | Age: 44
End: 2024-03-19
Payer: COMMERCIAL

## 2024-03-19 VITALS
HEART RATE: 93 BPM | TEMPERATURE: 97.9 F | DIASTOLIC BLOOD PRESSURE: 89 MMHG | BODY MASS INDEX: 31.8 KG/M2 | WEIGHT: 197 LBS | OXYGEN SATURATION: 95 % | SYSTOLIC BLOOD PRESSURE: 133 MMHG

## 2024-03-19 DIAGNOSIS — R07.89 OTHER CHEST PAIN: ICD-10-CM

## 2024-03-19 DIAGNOSIS — I25.10 ATHEROSCLEROTIC HEART DISEASE OF NATIVE CORONARY ARTERY W/OUT ANGINA PECTORIS: ICD-10-CM

## 2024-03-19 DIAGNOSIS — E78.5 HYPERLIPIDEMIA, UNSPECIFIED: ICD-10-CM

## 2024-03-19 DIAGNOSIS — I10 ESSENTIAL (PRIMARY) HYPERTENSION: ICD-10-CM

## 2024-03-19 PROCEDURE — G2211 COMPLEX E/M VISIT ADD ON: CPT

## 2024-03-19 PROCEDURE — 99214 OFFICE O/P EST MOD 30 MIN: CPT

## 2024-03-20 PROBLEM — I10 HTN (HYPERTENSION): Status: ACTIVE | Noted: 2020-06-09

## 2024-03-20 PROBLEM — I25.10 NONOBSTRUCTIVE ATHEROSCLEROSIS OF CORONARY ARTERY: Status: ACTIVE | Noted: 2020-10-03

## 2024-03-20 RX ORDER — LISINOPRIL 10 MG/1
10 TABLET ORAL
Qty: 90 | Refills: 2 | Status: DISCONTINUED | COMMUNITY
Start: 2021-04-19 | End: 2024-03-20

## 2024-03-25 ENCOUNTER — APPOINTMENT (OUTPATIENT)
Dept: DERMATOLOGY | Facility: CLINIC | Age: 44
End: 2024-03-25

## 2024-03-26 LAB
ALBUMIN SERPL ELPH-MCNC: 4.4 G/DL
ALP BLD-CCNC: 82 U/L
ALT SERPL-CCNC: 32 U/L
ANION GAP SERPL CALC-SCNC: 12 MMOL/L
AST SERPL-CCNC: 23 U/L
BILIRUB SERPL-MCNC: 0.3 MG/DL
BUN SERPL-MCNC: 10 MG/DL
CALCIUM SERPL-MCNC: 9.7 MG/DL
CHLORIDE SERPL-SCNC: 104 MMOL/L
CHOLEST SERPL-MCNC: 179 MG/DL
CO2 SERPL-SCNC: 26 MMOL/L
CREAT SERPL-MCNC: 0.98 MG/DL
EGFR: 97 ML/MIN/1.73M2
ESTIMATED AVERAGE GLUCOSE: 137 MG/DL
GLUCOSE SERPL-MCNC: 146 MG/DL
HBA1C MFR BLD HPLC: 6.4 %
HDLC SERPL-MCNC: 37 MG/DL
LDLC SERPL CALC-MCNC: 118 MG/DL
NONHDLC SERPL-MCNC: 142 MG/DL
POTASSIUM SERPL-SCNC: 4.4 MMOL/L
PROT SERPL-MCNC: 7.1 G/DL
SODIUM SERPL-SCNC: 141 MMOL/L
TRIGL SERPL-MCNC: 135 MG/DL
TSH SERPL-ACNC: 1.43 UIU/ML

## 2024-04-04 ENCOUNTER — APPOINTMENT (OUTPATIENT)
Dept: CT IMAGING | Facility: CLINIC | Age: 44
End: 2024-04-04
Payer: COMMERCIAL

## 2024-04-04 ENCOUNTER — OUTPATIENT (OUTPATIENT)
Dept: OUTPATIENT SERVICES | Facility: HOSPITAL | Age: 44
LOS: 1 days | End: 2024-04-04
Payer: COMMERCIAL

## 2024-04-04 DIAGNOSIS — I25.10 ATHEROSCLEROTIC HEART DISEASE OF NATIVE CORONARY ARTERY WITHOUT ANGINA PECTORIS: ICD-10-CM

## 2024-04-04 DIAGNOSIS — R07.89 OTHER CHEST PAIN: ICD-10-CM

## 2024-04-04 PROCEDURE — 75574 CT ANGIO HRT W/3D IMAGE: CPT | Mod: 26

## 2024-04-04 PROCEDURE — 75574 CT ANGIO HRT W/3D IMAGE: CPT

## 2024-04-04 NOTE — PHYSICAL EXAM
[de-identified] : /89, HR 93; as per AEHR General: No acute distress HEENT: EOMI  Neck: Supple, No JVD, no bruits Lungs: Clear to auscultation bilaterally; No rales or wheezing Heart: Regular rate and rhythm; No murmurs, rubs, or gallops Abdomen: Nontender, bowel sounds present Extremities: No clubbing, cyanosis, or edema Nervous system:  Alert & Oriented X3, no focal deficits Psychiatric: Normal affect Skin: No rashes or lesions

## 2024-04-04 NOTE — ASSESSMENT
[FreeTextEntry1] : 42 yo M with HTN, HLD, DM, non-obstructive CAD, and GERD who presents for FUV.   1. Chest discomfort: While symptoms are not classic for CAD, the fact that they are provoked with carrying heavy bottles of water upstairs is a concerning feature.  As such, will send patient for repeat coronary CT angiogram to assess for interval progression of CAD.    2. HTN: Appears well controlled on amlodipine 10mg, no LVH on echo (09/2020) with preserved EF. Will continue CCB.  -I previously prescribed lisinopril 10mg PO to be given on a PRN but patient has not had to use it; Rx renewed in case  3. HLD: Well controlled, however in setting of ASCVD seen on CCTA, patient was previously started on low-dose statin, which he tolerates without adverse effects.  -I addressed patient's concern regarding statins and cognitive dysfunction, and pointed out that the best evidence from randomized trials such as EBBINGHAUS trial have shown no difference in cognitive function in individuals taking PCSK9 inhibitors who developed very low levels of LDL.  Based on our discussion, patient agreeable to restart taking atorvastatin.  FUV 6 months or PRN.   Will call patient with results of testing at [phone], OK to leave voicemail.  Alternate number to call is his wife Lo.

## 2024-04-04 NOTE — HISTORY OF PRESENT ILLNESS
[FreeTextEntry1] : 42 yo M with HTN, HLD, DM, and GERD who presents for FUV. He underwent CCTA (2020) in setting of chest discomfort and abnormal stress test, showing <30% pLAD lesion with Calcium score 13, and incidentally noted hiatal hernia. Echocardiogram from 09/2020 showed EF 62%.   Patient complains of occasional left-sided chest pain, nonradiating, burning in nature, occurring about once a month, lasting several minutes at a time.  Symptoms are not associated with dyspnea but there is associated fatigue.  He denies lightheadedness or palpitations.  Patient states that the symptoms come on suddenly and are not always related to activity, though there may be some relationship with stress.  Of note, he does mention that the last several times when he would carry heavy bottles of water up the stairs he would develop chest pain associated with dyspnea.  Patient states he ran out of lisinopril about 1 week ago.  He stopped taking his statin about 1 to 2 months ago because of concerns related to risk of dementia.

## 2024-04-04 NOTE — ADDENDUM
[FreeTextEntry1] : ADDENDUM 4/4: CCTA from today showed mild luminal narrowing, no significant blockages noted. D/W patient, he is continuing with the statin.

## 2024-04-05 ENCOUNTER — APPOINTMENT (OUTPATIENT)
Dept: DERMATOLOGY | Facility: CLINIC | Age: 44
End: 2024-04-05

## 2024-04-11 ENCOUNTER — APPOINTMENT (OUTPATIENT)
Dept: DERMATOLOGY | Facility: CLINIC | Age: 44
End: 2024-04-11

## 2024-05-02 ENCOUNTER — APPOINTMENT (OUTPATIENT)
Dept: DERMATOLOGY | Facility: CLINIC | Age: 44
End: 2024-05-02

## 2024-05-02 NOTE — HISTORY OF PRESENT ILLNESS
[FreeTextEntry1] : return pt: skin tags [de-identified] : Mr. ZHAO DOOLEY is a 43 year old M hx DM2 last seen Feb 2024 by Dr. Stallworth, here for evaluation of  1. F/u skin tags on the neck and axillae. Quoted for cosmetic price of $200 at .

## 2024-05-02 NOTE — ASSESSMENT
[FreeTextEntry1] : 1. Eczematous dermatitis, chronic, flaring ISO xerosis - Discussed nature, chronicity, and unpredictable course - Rec aug betamethasone oint to AA bid 2x/day as needed for up to 2 weeks at a time, SED - Dry skin care reviewed: shower no more than once daily for 5-10 minutes with lukewarm water - Rec zyrtec daily  2. Xerosis Discussed nature, chronicity and unpredictable course - Discussed gentle skin care practices, including avoiding hot water, using fragrance free products, and frequent liberal moisturization with cream or ointment based emollient  3. Acrochordons about 7-10 tags - Diagnosis reviewed. I have discussed the benign nature and usual course with the patient. Patient reassured. - $200 fee quoted to remove, can make another visit   RTC for skin tag removal (cosmetic) or f/u in 1-2 mo

## 2024-05-07 RX ORDER — LISINOPRIL 10 MG/1
10 TABLET ORAL
Qty: 90 | Refills: 2 | Status: ACTIVE | COMMUNITY
Start: 1900-01-01 | End: 1900-01-01

## 2024-05-07 RX ORDER — ATORVASTATIN CALCIUM 10 MG/1
10 TABLET, FILM COATED ORAL
Qty: 90 | Refills: 2 | Status: ACTIVE | COMMUNITY
Start: 2020-10-03 | End: 1900-01-01

## 2024-05-07 RX ORDER — AMLODIPINE BESYLATE 10 MG/1
10 TABLET ORAL
Qty: 90 | Refills: 2 | Status: ACTIVE | COMMUNITY
Start: 2020-06-09 | End: 1900-01-01

## 2024-07-18 ENCOUNTER — APPOINTMENT (OUTPATIENT)
Dept: DERMATOLOGY | Facility: CLINIC | Age: 44
End: 2024-07-18

## 2024-07-18 VITALS
HEART RATE: 73 BPM | DIASTOLIC BLOOD PRESSURE: 72 MMHG | WEIGHT: 192 LBS | HEIGHT: 66 IN | OXYGEN SATURATION: 98 % | SYSTOLIC BLOOD PRESSURE: 106 MMHG | BODY MASS INDEX: 30.86 KG/M2

## 2024-07-18 DIAGNOSIS — Z87.2 PERSONAL HISTORY OF DISEASES OF THE SKIN AND SUBCUTANEOUS TISSUE: ICD-10-CM

## 2024-07-18 DIAGNOSIS — L85.3 XEROSIS CUTIS: ICD-10-CM

## 2024-07-18 DIAGNOSIS — L30.9 DERMATITIS, UNSPECIFIED: ICD-10-CM

## 2024-07-18 DIAGNOSIS — L28.0 LICHEN SIMPLEX CHRONICUS: ICD-10-CM

## 2024-07-18 DIAGNOSIS — L28.2 OTHER PRURIGO: ICD-10-CM

## 2024-07-18 PROCEDURE — 99214 OFFICE O/P EST MOD 30 MIN: CPT | Mod: 25

## 2024-07-18 PROCEDURE — 11901 INJECT SKIN LESIONS >7: CPT

## 2024-07-18 RX ORDER — ATORVASTATIN CALCIUM 80 MG/1
TABLET, FILM COATED ORAL
Refills: 0 | Status: ACTIVE | COMMUNITY

## 2024-07-18 RX ORDER — TRIAMCINOLONE ACETONIDE 1 MG/ML
0.1 LOTION TOPICAL
Qty: 1 | Refills: 1 | Status: ACTIVE | COMMUNITY
Start: 2024-07-18 | End: 1900-01-01

## 2024-07-18 RX ORDER — FLUOCINONIDE 0.5 MG/G
0.05 CREAM TOPICAL
Qty: 1 | Refills: 1 | Status: ACTIVE | COMMUNITY
Start: 2024-07-18 | End: 1900-01-01

## 2024-07-18 RX ORDER — AMLODIPINE BESYLATE 5 MG/1
TABLET ORAL
Refills: 0 | Status: ACTIVE | COMMUNITY

## 2024-07-29 ENCOUNTER — RX RENEWAL (OUTPATIENT)
Age: 44
End: 2024-07-29

## 2024-08-02 ENCOUNTER — APPOINTMENT (OUTPATIENT)
Dept: INTERNAL MEDICINE | Facility: CLINIC | Age: 44
End: 2024-08-02
Payer: COMMERCIAL

## 2024-08-02 ENCOUNTER — OUTPATIENT (OUTPATIENT)
Dept: OUTPATIENT SERVICES | Facility: HOSPITAL | Age: 44
LOS: 1 days | End: 2024-08-02
Payer: COMMERCIAL

## 2024-08-02 VITALS
HEIGHT: 66 IN | HEART RATE: 75 BPM | WEIGHT: 198 LBS | SYSTOLIC BLOOD PRESSURE: 111 MMHG | DIASTOLIC BLOOD PRESSURE: 77 MMHG | TEMPERATURE: 97.2 F | OXYGEN SATURATION: 99 % | BODY MASS INDEX: 31.82 KG/M2

## 2024-08-02 DIAGNOSIS — Z00.00 ENCOUNTER FOR GENERAL ADULT MEDICAL EXAMINATION WITHOUT ABNORMAL FINDINGS: ICD-10-CM

## 2024-08-02 DIAGNOSIS — M79.604 PAIN IN RIGHT LEG: ICD-10-CM

## 2024-08-02 DIAGNOSIS — M79.606 PAIN IN LEG, UNSPECIFIED: ICD-10-CM

## 2024-08-02 DIAGNOSIS — E11.9 TYPE 2 DIABETES MELLITUS W/OUT COMPLICATIONS: ICD-10-CM

## 2024-08-02 PROCEDURE — G0463: CPT

## 2024-08-02 PROCEDURE — 36415 COLL VENOUS BLD VENIPUNCTURE: CPT

## 2024-08-02 PROCEDURE — G2211 COMPLEX E/M VISIT ADD ON: CPT

## 2024-08-02 PROCEDURE — 99215 OFFICE O/P EST HI 40 MIN: CPT

## 2024-08-02 RX ORDER — IBUPROFEN 600 MG/1
600 TABLET, FILM COATED ORAL
Qty: 21 | Refills: 0 | Status: ACTIVE | COMMUNITY
Start: 2024-08-02 | End: 1900-01-01

## 2024-08-02 NOTE — PHYSICAL EXAM
[Pedal Pulses Present] : the pedal pulses are present [Normal] : no joint swelling and grossly normal strength and tone

## 2024-08-03 NOTE — END OF VISIT
[] : Resident [FreeTextEntry3] : I, Dr. Florentin Coello, saw and evaluated this patient in the presence of the resident. I discussed the management with the resident. I reviewed the note and agree with the documented plan of care with the following confirmations/corrections/additions: None.

## 2024-08-03 NOTE — ASSESSMENT
[FreeTextEntry1] : 1. DM Last A1C 6.4 March 2024 no polyuria or polydipsia repeat A1c  2. RLE Pain RLE doppler U/S  start ibuprofen  heat in am, cold in pm  3. Pes Planus podiatry referral stretch feet for prolonged work days  4. Establish care f/u in November for discussion on colonoscopy declines flu shot

## 2024-08-03 NOTE — HEALTH RISK ASSESSMENT
[No] : In the past 12 months have you used drugs other than those required for medical reasons? No [0] : 2) Feeling down, depressed, or hopeless: Not at all (0) [[id number]] : 0

## 2024-08-03 NOTE — HISTORY OF PRESENT ILLNESS
[FreeTextEntry1] : establish care [de-identified] : Alec Strange is a 42 yo M PMHx DM, Eczema, GERD, HTN, HLD, nonobstructive CAD, b/l Pes planus, Vit D deficiency here to establish care and with RLE pain. Last time saw Dr. Cordova (previous pCP) ~2022. Doesn't take anything for his DB. He recently changed his diet to decrease carbs, add more protein, and incorporate fruits smoothies with natural seeds. Has also decreased his sugar in his tea and eats salad in evening. Usually takes aleve for his pain in his R foot along with a massage gun which helps too. States that it is nonstop since yesterday. Drives 16 hours a day 6 to 7 days a week. No noted swelling in legs.   States that he takes Amlodipine, Atorvaststain, Lisinopril, Nexium qd. Negative flu shot (not a fan), COVID shot -, but got all 3 from pandemic. Got an endoscopy in 2012. Pt a bit uncomfortable with a colonoscopy but will revisit in November for his annual visit.

## 2024-08-06 DIAGNOSIS — M79.604 PAIN IN RIGHT LEG: ICD-10-CM

## 2024-08-06 DIAGNOSIS — E11.9 TYPE 2 DIABETES MELLITUS WITHOUT COMPLICATIONS: ICD-10-CM

## 2024-08-07 ENCOUNTER — OUTPATIENT (OUTPATIENT)
Dept: OUTPATIENT SERVICES | Facility: HOSPITAL | Age: 44
LOS: 1 days | End: 2024-08-07
Payer: COMMERCIAL

## 2024-08-07 ENCOUNTER — APPOINTMENT (OUTPATIENT)
Dept: PODIATRY | Facility: CLINIC | Age: 44
End: 2024-08-07

## 2024-08-07 DIAGNOSIS — Z00.00 ENCOUNTER FOR GENERAL ADULT MEDICAL EXAMINATION WITHOUT ABNORMAL FINDINGS: ICD-10-CM

## 2024-08-07 PROCEDURE — 20605 DRAIN/INJ JOINT/BURSA W/O US: CPT

## 2024-08-07 PROCEDURE — G0463: CPT

## 2024-08-07 NOTE — HISTORY OF PRESENT ILLNESS
[Sneakers] : arely [FreeTextEntry1] : HPI: 43M with pmhx of prediabetes and HTN presents with b/l foot pain with R>L x few months. States pain worsened in last 10 days, grades pain 9/10. Pain with ambulation and instability of ankle especially on Right side. Not wearing supportive shoes or orthotics. 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 States he has been taking alleve to relieve some pain however does not last a long time. Patient also complains of flatfoot b/l which has been persistent for many years. Has had x-rays in the past, does not recall results however. Patient seen ambulating in restrictive dress shoes.  ROS: unremarkable outside HPI  Physical MSK: TTP along R sinus tarsi, b/l pain to TP course retromalleolar, Pain along lateral column, on stance position-too many toe signs, talar head bulge. Pain along ATFL,  Neuro: Protective sensation grossly intact Vascular: DP and PT palpable 2/4, skin tg warm to warm proximal to distal Derm: No open lesions, no concern for infection  ROS: unremarkable outside HPI  A: Right lateral ankle pain Sinus Tarsi syndrome R Ankle pain  P: Patient seen and evaluated  Discussed diagnosis with patient  Treatment modalities discussed Recc x-ray b/l foot and ankle Reccomend custom orthotics for b/l progressive flatfoot Rx: Mobic 15mg X 14 days Possible MRI if continous pain obtained verbal and written consent for R sinus tarsi injection. injected 2 cc mix of 0.25% marcaine plain and dexamethasone. Patient tolerated procedure well Recc RICE therapy Recc Oofos for home wear  F/u within 1 week   Written by Regina King PGY-3

## 2024-08-07 NOTE — ASSESSMENT
[FreeTextEntry1] : Physical MSK: TTP along R sinus tarsi, b/l pain to TP course retromalleolar, Pain along lateral column, on stance position-too many toe signs, talar head bulge. Pain along ATFL,  Neuro: Protective sensation grossly intact Vascular: DP and PT palpable 2/4, skin tg warm to warm proximal to distal Derm: No open lesions, no concern for infection  ROS: unremarkable outside HPI  A: Right lateral ankle pain Sinus Tarsi syndrome R Ankle pain  P: Patient seen and evaluated  Discussed diagnosis with patient  Treatment modalities discussed Recc x-ray b/l foot and ankle, ordered Reccomend custom orthotics for b/l progressive flatfoot Rx: Mobic 15mg X 14 days Possible MRI if continous pain obtained verbal and written consent for R sinus tarsi injection. injected 2 cc mix of 1:1 mix 0.25% marcaine plain and dexamethasone. Patient tolerated procedure well Recc RICE therapy Recc Oofos for home wear  F/u within 1 week   Written by Regina King PGY-3

## 2024-08-08 DIAGNOSIS — M21.41 FLAT FOOT [PES PLANUS] (ACQUIRED), RIGHT FOOT: ICD-10-CM

## 2024-08-08 DIAGNOSIS — M25.571 PAIN IN RIGHT ANKLE AND JOINTS OF RIGHT FOOT: ICD-10-CM

## 2024-08-08 LAB
ESTIMATED AVERAGE GLUCOSE: 151 MG/DL
HBA1C MFR BLD HPLC: 6.9 %

## 2024-09-10 ENCOUNTER — APPOINTMENT (OUTPATIENT)
Dept: HUMAN REPRODUCTION | Facility: CLINIC | Age: 44
End: 2024-09-10
Payer: COMMERCIAL

## 2024-09-10 PROCEDURE — 36415 COLL VENOUS BLD VENIPUNCTURE: CPT

## 2024-09-12 ENCOUNTER — APPOINTMENT (OUTPATIENT)
Dept: PODIATRY | Facility: CLINIC | Age: 44
End: 2024-09-12
Payer: COMMERCIAL

## 2024-09-12 DIAGNOSIS — Q66.52 CONGENITAL PES PLANUS, RIGHT FOOT: ICD-10-CM

## 2024-09-12 DIAGNOSIS — M79.673 PAIN IN UNSPECIFIED FOOT: ICD-10-CM

## 2024-09-12 DIAGNOSIS — Q66.51 CONGENITAL PES PLANUS, RIGHT FOOT: ICD-10-CM

## 2024-09-12 PROCEDURE — 99203 OFFICE O/P NEW LOW 30 MIN: CPT

## 2024-09-12 PROCEDURE — 73620 X-RAY EXAM OF FOOT: CPT | Mod: RT

## 2024-09-12 PROCEDURE — 73610 X-RAY EXAM OF ANKLE: CPT | Mod: LT

## 2024-09-16 PROBLEM — M79.673 PAIN, FOOT: Status: ACTIVE | Noted: 2024-09-16

## 2024-09-16 PROBLEM — Q66.51 CONGENITAL PES PLANUS OF BOTH FEET: Status: ACTIVE | Noted: 2024-09-16

## 2024-09-16 NOTE — PHYSICAL EXAM
[2+] : left foot dorsalis pedis 2+ [Varicose Veins Of Lower Extremities] : not present [FreeTextEntry3] : Temperature gradient within normal limits. CFT: 3 seconds x10  [de-identified] : There is equinus deformity bilaterally with a forefoot varus and pes planus deformity, semi-rigid in nature. Negative Hubscher maneuver bilaterally upon weight-bearing. There is abduction of the forefoot upon weight-bearing as well. Negative pain on ROM of the subtalar joint or ankle joint.  He has pain on direct palpation on the lateral gutter of the left ankle greater than right and the anterolateral aspect of bilateral ankles. There is negative pain on palpation along the posterior tibial tendon, navicular or on dorsiflexion of the hallux. There is pain on palpation of the plantar fascial band itself medial and centrally, left greater than right.   [FreeTextEntry4] : vibratory intact 1st MPJ [FreeTextEntry8] : vibratory intact 1st MPJ [FreeTextEntry1] : Negative Tinel's sign bilaterally.  [place] monofilament wire is intact bilateral.

## 2024-09-16 NOTE — ASSESSMENT
[FreeTextEntry1] : Impression: Congenital pes planus. Pain in foot.  Treatment: Discussed findings and conditions with the patient. Discussed likely etiology for his pain. I had recommended an ASO type brace for the left with laces and straps to help immobilize the ankle and support the ankle and foot. Patient is to wear on the left as he is a , and the brace may limit dorsiflexion of the ankle while driving. He was recommended Powerstep insoles to help support the structures of his foot and help with the biomechanics. He is to avoid the loafers and stay in with a [place] type shoe for work, comfortable sneakers when outside of work, along with Oofos for the house to help support the alignment of the biomechanics to see if it alleviates his symptoms. He was given stretching exercises, which he is to perform twice daily. Patient would like to refrain from use of anti-inflammatory medications at this time. Patient is to return in approximately 2 to 3 weeks for reevaluation. We discussed further conservative measures and discussed surgical intervention if warranted.  I also discussed possible evaluation with neurology to R/O lower back radiculopathy also contributing to some of his pain and symptoms. Patient will follow-up. Any increase in pain, problems, or questions, he is to contact the office.

## 2024-09-16 NOTE — HISTORY OF PRESENT ILLNESS
[FreeTextEntry1] : Patient presents today with pain in bilateral feet. He says he has a history of flat foot since childhood, but the pain has been increasing over the past several months.  He states he has difficulty walking. He has pain with prolonged standing. He does drive a lot and has been having some issues with his lower back but no traumatic injury to the back. Patient also complains of pain at night and has to wake up and take Aleve. He states Aleve is the only anti-inflammatory that seems to help with his pain when it occurs. Patient was seen approximately a month ago by an orthopedist who had recommended an evaluation. He was given a Cortisone injection in the right subtalar joint, which he states he has helped somewhat but the pain returned. Patient has had Cortisone injections in the past with minimal relief of symptoms. He presents today wearing a loafer type shoe. He states that is the shoe he particularly likes to wear just based on convenience. He does walk with a flat slipper while in the house.

## 2024-09-16 NOTE — HISTORY OF PRESENT ILLNESS
Bed: 03  Expected date: 4/7/19  Expected time: 9:31 AM  Means of arrival:   Comments:  Stroke code EMS   [FreeTextEntry1] : Patient presents today with pain in bilateral feet. He says he has a history of flat foot since childhood, but the pain has been increasing over the past several months.  He states he has difficulty walking. He has pain with prolonged standing. He does drive a lot and has been having some issues with his lower back but no traumatic injury to the back. Patient also complains of pain at night and has to wake up and take Aleve. He states Aleve is the only anti-inflammatory that seems to help with his pain when it occurs. Patient was seen approximately a month ago by an orthopedist who had recommended an evaluation. He was given a Cortisone injection in the right subtalar joint, which he states he has helped somewhat but the pain returned. Patient has had Cortisone injections in the past with minimal relief of symptoms. He presents today wearing a loafer type shoe. He states that is the shoe he particularly likes to wear just based on convenience. He does walk with a flat slipper while in the house.

## 2024-09-16 NOTE — PROCEDURE
[FreeTextEntry1] : X-ray Report: Weight bearing x-rays, 3 views of the left ankle, 3 views of the right ankle, 2 views of the left foot, 2 views of the right foot, were obtained for evaluation. There is ankle joint bilaterally that is congruent. There is negative signs of OCD. Negative signs of acute fracture or dislocation. On lateral view there is an increase in talar declination and decrease in the calcaneal inclination angles bilaterally with break in the cyma line bilateral. There is mild degenerative changes at the mid tarsal and talonavicular joint. Mild talar beaking, right greater than left. The posterior facet and middle facet appear without coalition. No signs of coalition noted on radiographs. On DP view there is uncovering of the talar head. Negative subluxation, fracture or dislocations noted. Negative osseous coalitions noted in the midfoot.

## 2024-09-16 NOTE — ASSESSMENT
[FreeTextEntry1] : Impression: Congenital pes planus. Pain in foot.  Treatment: Discussed findings and conditions with the patient. Discussed likely etiology for his pain. I had recommended an ASO type brace for the left with laces and straps to help immobilize the ankle and support the ankle and foot. Patient is to wear on the left as he is a , and the brace may limit dorsiflexion of the ankle while driving. He was recommended Powerstep insoles to help support the structures of his foot and help with the biomechanics. He is to avoid the loafers and stay in with a [place] type shoe for work, comfortable sneakers when outside of work, along with Oofos for the house to help support the alignment of the biomechanics to see if it alleviates his symptoms. He was given stretching exercises, which he is to perform twice daily. Patient would like to refrain from use of anti-inflammatory medications at this time. Patient is to return in approximately 2 to 3 weeks for reevaluation. We discussed further conservative measures and discussed surgical intervention if warranted.  I also discussed possible evaluation with neurology to R/O lower back radiculopathy also contributing to some of his pain and symptoms. Patient will follow-up. Any increase in pain, problems, or questions, he is to contact the office.

## 2024-09-19 ENCOUNTER — APPOINTMENT (OUTPATIENT)
Dept: HUMAN REPRODUCTION | Facility: CLINIC | Age: 44
End: 2024-09-19
Payer: COMMERCIAL

## 2024-09-19 PROCEDURE — 89322 SEMEN ANAL STRICT CRITERIA: CPT

## 2024-12-05 ENCOUNTER — NON-APPOINTMENT (OUTPATIENT)
Age: 44
End: 2024-12-05

## 2025-01-28 ENCOUNTER — APPOINTMENT (OUTPATIENT)
Dept: DERMATOLOGY | Facility: CLINIC | Age: 45
End: 2025-01-28
Payer: COMMERCIAL

## 2025-01-28 VITALS — WEIGHT: 198 LBS | HEIGHT: 66 IN | BODY MASS INDEX: 31.82 KG/M2

## 2025-01-28 DIAGNOSIS — L30.9 DERMATITIS, UNSPECIFIED: ICD-10-CM

## 2025-01-28 DIAGNOSIS — L28.2 OTHER PRURIGO: ICD-10-CM

## 2025-01-28 PROCEDURE — 11900 INJECT SKIN LESIONS </W 7: CPT

## 2025-01-28 PROCEDURE — 99214 OFFICE O/P EST MOD 30 MIN: CPT | Mod: 25

## 2025-01-28 RX ORDER — PIMECROLIMUS 10 MG/G
1 CREAM TOPICAL
Qty: 1 | Refills: 2 | Status: ACTIVE | COMMUNITY
Start: 2025-01-28 | End: 1900-01-01

## 2025-01-29 RX ORDER — TACROLIMUS 1 MG/G
0.1 OINTMENT TOPICAL
Qty: 1 | Refills: 2 | Status: ACTIVE | COMMUNITY
Start: 2025-01-29 | End: 1900-01-01

## 2025-04-14 ENCOUNTER — APPOINTMENT (OUTPATIENT)
Dept: HUMAN REPRODUCTION | Facility: CLINIC | Age: 45
End: 2025-04-14
Payer: COMMERCIAL

## 2025-04-14 PROCEDURE — ZZZZZ: CPT

## 2025-05-14 ENCOUNTER — NON-APPOINTMENT (OUTPATIENT)
Age: 45
End: 2025-05-14

## 2025-05-14 ENCOUNTER — APPOINTMENT (OUTPATIENT)
Dept: DERMATOLOGY | Facility: CLINIC | Age: 45
End: 2025-05-14
Payer: COMMERCIAL

## 2025-05-14 VITALS — WEIGHT: 198 LBS | BODY MASS INDEX: 31.82 KG/M2 | HEIGHT: 66 IN

## 2025-05-14 DIAGNOSIS — L30.9 DERMATITIS, UNSPECIFIED: ICD-10-CM

## 2025-05-14 DIAGNOSIS — L28.2 OTHER PRURIGO: ICD-10-CM

## 2025-05-14 PROCEDURE — 11900 INJECT SKIN LESIONS </W 7: CPT

## 2025-05-14 PROCEDURE — 99214 OFFICE O/P EST MOD 30 MIN: CPT | Mod: 25

## 2025-05-15 ENCOUNTER — NON-APPOINTMENT (OUTPATIENT)
Age: 45
End: 2025-05-15

## 2025-06-03 ENCOUNTER — NON-APPOINTMENT (OUTPATIENT)
Age: 45
End: 2025-06-03

## 2025-06-04 ENCOUNTER — APPOINTMENT (OUTPATIENT)
Dept: ENDOCRINOLOGY | Facility: CLINIC | Age: 45
End: 2025-06-04
Payer: COMMERCIAL

## 2025-06-04 VITALS
OXYGEN SATURATION: 98 % | WEIGHT: 194 LBS | RESPIRATION RATE: 16 BRPM | BODY MASS INDEX: 31.18 KG/M2 | DIASTOLIC BLOOD PRESSURE: 78 MMHG | SYSTOLIC BLOOD PRESSURE: 121 MMHG | HEIGHT: 66 IN | TEMPERATURE: 97 F | HEART RATE: 88 BPM

## 2025-06-04 DIAGNOSIS — E78.5 HYPERLIPIDEMIA, UNSPECIFIED: ICD-10-CM

## 2025-06-04 DIAGNOSIS — I10 ESSENTIAL (PRIMARY) HYPERTENSION: ICD-10-CM

## 2025-06-04 DIAGNOSIS — Q66.51 CONGENITAL PES PLANUS, RIGHT FOOT: ICD-10-CM

## 2025-06-04 DIAGNOSIS — E11.9 TYPE 2 DIABETES MELLITUS W/OUT COMPLICATIONS: ICD-10-CM

## 2025-06-04 DIAGNOSIS — Q66.52 CONGENITAL PES PLANUS, RIGHT FOOT: ICD-10-CM

## 2025-06-04 DIAGNOSIS — M79.673 PAIN IN UNSPECIFIED FOOT: ICD-10-CM

## 2025-06-04 LAB
GLUCOSE BLDC GLUCOMTR-MCNC: 148
HBA1C MFR BLD HPLC: 8

## 2025-06-04 PROCEDURE — 82962 GLUCOSE BLOOD TEST: CPT

## 2025-06-04 PROCEDURE — 99214 OFFICE O/P EST MOD 30 MIN: CPT

## 2025-06-04 PROCEDURE — G2211 COMPLEX E/M VISIT ADD ON: CPT

## 2025-06-04 PROCEDURE — 83036 HEMOGLOBIN GLYCOSYLATED A1C: CPT | Mod: QW

## 2025-06-05 NOTE — PATIENT PROFILE ADULT. - MENTAL HEALTH CONDITIONS/SYMPTOMS, PROFILE
Rx request routed to MICHAEL Iraheta, CNP to deny.  Yordan Stratton's patient has active rx sent 5/28/25 as per chart history.  Maria Teresa Hyman, CMA     none

## 2025-06-06 RX ORDER — TIRZEPATIDE 2.5 MG/.5ML
2.5 INJECTION, SOLUTION SUBCUTANEOUS
Qty: 4 | Refills: 0 | Status: ACTIVE | COMMUNITY
Start: 2025-06-04

## 2025-06-10 LAB
ALBUMIN SERPL ELPH-MCNC: 4.5 G/DL
ALP BLD-CCNC: 94 U/L
ALT SERPL-CCNC: 35 U/L
ANION GAP SERPL CALC-SCNC: 13 MMOL/L
AST SERPL-CCNC: 21 U/L
BILIRUB SERPL-MCNC: 0.6 MG/DL
BUN SERPL-MCNC: 10 MG/DL
CALCIUM SERPL-MCNC: 9.6 MG/DL
CHLORIDE SERPL-SCNC: 103 MMOL/L
CHOLEST SERPL-MCNC: 104 MG/DL
CO2 SERPL-SCNC: 23 MMOL/L
CREAT SERPL-MCNC: 1.01 MG/DL
CREAT SPEC-SCNC: 161 MG/DL
EGFRCR SERPLBLD CKD-EPI 2021: 93 ML/MIN/1.73M2
GLUCOSE SERPL-MCNC: 150 MG/DL
HDLC SERPL-MCNC: 34 MG/DL
LDLC SERPL-MCNC: 52 MG/DL
MICROALBUMIN 24H UR DL<=1MG/L-MCNC: <1.2 MG/DL
MICROALBUMIN/CREAT 24H UR-RTO: NORMAL MG/G
NONHDLC SERPL-MCNC: 70 MG/DL
POTASSIUM SERPL-SCNC: 4.4 MMOL/L
PROT SERPL-MCNC: 6.9 G/DL
SODIUM SERPL-SCNC: 140 MMOL/L
TRIGL SERPL-MCNC: 93 MG/DL

## 2025-07-07 ENCOUNTER — NON-APPOINTMENT (OUTPATIENT)
Age: 45
End: 2025-07-07

## 2025-08-06 ENCOUNTER — APPOINTMENT (OUTPATIENT)
Dept: INTERNAL MEDICINE | Facility: CLINIC | Age: 45
End: 2025-08-06
Payer: COMMERCIAL

## 2025-08-06 VITALS
RESPIRATION RATE: 16 BRPM | HEART RATE: 79 BPM | WEIGHT: 183 LBS | HEIGHT: 66 IN | OXYGEN SATURATION: 97 % | TEMPERATURE: 98.2 F | SYSTOLIC BLOOD PRESSURE: 109 MMHG | DIASTOLIC BLOOD PRESSURE: 76 MMHG | BODY MASS INDEX: 29.41 KG/M2

## 2025-08-06 DIAGNOSIS — Z00.00 ENCOUNTER FOR GENERAL ADULT MEDICAL EXAMINATION W/OUT ABNORMAL FINDINGS: ICD-10-CM

## 2025-08-06 DIAGNOSIS — I10 ESSENTIAL (PRIMARY) HYPERTENSION: ICD-10-CM

## 2025-08-06 DIAGNOSIS — H93.8X9 OTHER SPECIFIED DISORDERS OF EAR, UNSPECIFIED EAR: ICD-10-CM

## 2025-08-06 PROCEDURE — 99396 PREV VISIT EST AGE 40-64: CPT

## 2025-08-06 RX ORDER — CETIRIZINE HYDROCHLORIDE 10 MG/1
10 TABLET, COATED ORAL
Qty: 90 | Refills: 0 | Status: ACTIVE | COMMUNITY
Start: 2025-08-06 | End: 1900-01-01

## 2025-08-06 RX ORDER — AZELASTINE HYDROCHLORIDE 137 UG/1
0.1 SPRAY, METERED NASAL DAILY
Qty: 1 | Refills: 0 | Status: ACTIVE | COMMUNITY
Start: 2025-08-06 | End: 1900-01-01

## 2025-08-07 DIAGNOSIS — Z12.11 ENCOUNTER FOR SCREENING FOR MALIGNANT NEOPLASM OF COLON: ICD-10-CM

## 2025-08-25 ENCOUNTER — NON-APPOINTMENT (OUTPATIENT)
Age: 45
End: 2025-08-25

## 2025-08-27 ENCOUNTER — APPOINTMENT (OUTPATIENT)
Dept: ENDOCRINOLOGY | Facility: CLINIC | Age: 45
End: 2025-08-27
Payer: COMMERCIAL

## 2025-08-27 VITALS
TEMPERATURE: 97.2 F | OXYGEN SATURATION: 97 % | HEIGHT: 66 IN | BODY MASS INDEX: 29.41 KG/M2 | DIASTOLIC BLOOD PRESSURE: 74 MMHG | RESPIRATION RATE: 16 BRPM | HEART RATE: 75 BPM | SYSTOLIC BLOOD PRESSURE: 111 MMHG | WEIGHT: 183 LBS

## 2025-08-27 DIAGNOSIS — E66.3 OVERWEIGHT: ICD-10-CM

## 2025-08-27 DIAGNOSIS — E11.9 TYPE 2 DIABETES MELLITUS W/OUT COMPLICATIONS: ICD-10-CM

## 2025-08-27 LAB
GLUCOSE BLDC GLUCOMTR-MCNC: 96
HBA1C MFR BLD HPLC: 6.1

## 2025-08-27 PROCEDURE — 82962 GLUCOSE BLOOD TEST: CPT

## 2025-08-27 PROCEDURE — 99214 OFFICE O/P EST MOD 30 MIN: CPT

## 2025-08-27 PROCEDURE — G2211 COMPLEX E/M VISIT ADD ON: CPT

## 2025-08-27 PROCEDURE — 83036 HEMOGLOBIN GLYCOSYLATED A1C: CPT | Mod: QW

## 2025-09-02 ENCOUNTER — RX RENEWAL (OUTPATIENT)
Age: 45
End: 2025-09-02

## 2025-09-03 ENCOUNTER — APPOINTMENT (OUTPATIENT)
Dept: ENDOCRINOLOGY | Facility: CLINIC | Age: 45
End: 2025-09-03

## 2025-09-16 ENCOUNTER — APPOINTMENT (OUTPATIENT)
Dept: CARDIOLOGY | Facility: CLINIC | Age: 45
End: 2025-09-16
Payer: COMMERCIAL

## 2025-09-16 VITALS
SYSTOLIC BLOOD PRESSURE: 111 MMHG | DIASTOLIC BLOOD PRESSURE: 79 MMHG | HEART RATE: 71 BPM | OXYGEN SATURATION: 97 % | TEMPERATURE: 97.9 F

## 2025-09-16 DIAGNOSIS — I10 ESSENTIAL (PRIMARY) HYPERTENSION: ICD-10-CM

## 2025-09-16 DIAGNOSIS — I25.10 ATHEROSCLEROTIC HEART DISEASE OF NATIVE CORONARY ARTERY W/OUT ANGINA PECTORIS: ICD-10-CM

## 2025-09-16 DIAGNOSIS — E78.5 HYPERLIPIDEMIA, UNSPECIFIED: ICD-10-CM

## 2025-09-16 PROCEDURE — 99214 OFFICE O/P EST MOD 30 MIN: CPT

## 2025-09-16 PROCEDURE — G2211 COMPLEX E/M VISIT ADD ON: CPT

## 2025-09-16 PROCEDURE — 93000 ELECTROCARDIOGRAM COMPLETE: CPT
